# Patient Record
Sex: MALE | Race: WHITE | NOT HISPANIC OR LATINO | URBAN - METROPOLITAN AREA
[De-identification: names, ages, dates, MRNs, and addresses within clinical notes are randomized per-mention and may not be internally consistent; named-entity substitution may affect disease eponyms.]

---

## 2018-08-24 ENCOUNTER — INPATIENT (INPATIENT)
Facility: HOSPITAL | Age: 83
LOS: 3 days | Discharge: NURSING, REHAB OR LONG-TERM CARE | DRG: 640 | End: 2018-08-28
Attending: INTERNAL MEDICINE | Admitting: INTERNAL MEDICINE
Payer: MEDICARE

## 2018-08-24 VITALS
RESPIRATION RATE: 19 BRPM | HEIGHT: 68 IN | DIASTOLIC BLOOD PRESSURE: 95 MMHG | OXYGEN SATURATION: 98 % | SYSTOLIC BLOOD PRESSURE: 189 MMHG | TEMPERATURE: 98 F | HEART RATE: 64 BPM | WEIGHT: 169.98 LBS

## 2018-08-24 DIAGNOSIS — E87.5 HYPERKALEMIA: ICD-10-CM

## 2018-08-24 LAB
ALBUMIN SERPL ELPH-MCNC: 3.1 G/DL — LOW (ref 3.5–5)
ALP SERPL-CCNC: 102 U/L — SIGNIFICANT CHANGE UP (ref 40–120)
ALT FLD-CCNC: 19 U/L DA — SIGNIFICANT CHANGE UP (ref 10–60)
ANION GAP SERPL CALC-SCNC: 12 MMOL/L — SIGNIFICANT CHANGE UP (ref 5–17)
AST SERPL-CCNC: 19 U/L — SIGNIFICANT CHANGE UP (ref 10–40)
BILIRUB SERPL-MCNC: 0.2 MG/DL — SIGNIFICANT CHANGE UP (ref 0.2–1.2)
BUN SERPL-MCNC: 112 MG/DL — HIGH (ref 7–18)
CALCIUM SERPL-MCNC: 8.2 MG/DL — LOW (ref 8.4–10.5)
CHLORIDE SERPL-SCNC: 107 MMOL/L — SIGNIFICANT CHANGE UP (ref 96–108)
CO2 SERPL-SCNC: 19 MMOL/L — LOW (ref 22–31)
CREAT SERPL-MCNC: 6.39 MG/DL — HIGH (ref 0.5–1.3)
GLUCOSE SERPL-MCNC: 113 MG/DL — HIGH (ref 70–99)
HCT VFR BLD CALC: 29.1 % — LOW (ref 39–50)
HGB BLD-MCNC: 9 G/DL — LOW (ref 13–17)
MAGNESIUM SERPL-MCNC: 2.1 MG/DL — SIGNIFICANT CHANGE UP (ref 1.6–2.6)
MCHC RBC-ENTMCNC: 30.8 GM/DL — LOW (ref 32–36)
MCHC RBC-ENTMCNC: 32.2 PG — SIGNIFICANT CHANGE UP (ref 27–34)
MCV RBC AUTO: 104.5 FL — HIGH (ref 80–100)
PLATELET # BLD AUTO: 146 K/UL — LOW (ref 150–400)
POTASSIUM SERPL-MCNC: 5.8 MMOL/L — HIGH (ref 3.5–5.3)
POTASSIUM SERPL-SCNC: 5.8 MMOL/L — HIGH (ref 3.5–5.3)
PROT SERPL-MCNC: 8 G/DL — SIGNIFICANT CHANGE UP (ref 6–8.3)
RBC # BLD: 2.79 M/UL — LOW (ref 4.2–5.8)
RBC # FLD: 14.9 % — HIGH (ref 10.3–14.5)
SODIUM SERPL-SCNC: 138 MMOL/L — SIGNIFICANT CHANGE UP (ref 135–145)
WBC # BLD: 5.4 K/UL — SIGNIFICANT CHANGE UP (ref 3.8–10.5)
WBC # FLD AUTO: 5.4 K/UL — SIGNIFICANT CHANGE UP (ref 3.8–10.5)

## 2018-08-24 PROCEDURE — 93010 ELECTROCARDIOGRAM REPORT: CPT

## 2018-08-24 RX ORDER — SODIUM POLYSTYRENE SULFONATE 4.1 MEQ/G
30 POWDER, FOR SUSPENSION ORAL ONCE
Qty: 0 | Refills: 0 | Status: COMPLETED | OUTPATIENT
Start: 2018-08-24 | End: 2018-08-24

## 2018-08-24 RX ORDER — SODIUM CHLORIDE 9 MG/ML
1000 INJECTION INTRAMUSCULAR; INTRAVENOUS; SUBCUTANEOUS ONCE
Qty: 0 | Refills: 0 | Status: DISCONTINUED | OUTPATIENT
Start: 2018-08-24 | End: 2018-08-24

## 2018-08-24 RX ORDER — INSULIN HUMAN 100 [IU]/ML
13 INJECTION, SOLUTION SUBCUTANEOUS ONCE
Qty: 0 | Refills: 0 | Status: COMPLETED | OUTPATIENT
Start: 2018-08-24 | End: 2018-08-24

## 2018-08-24 RX ORDER — DEXTROSE 50 % IN WATER 50 %
50 SYRINGE (ML) INTRAVENOUS ONCE
Qty: 0 | Refills: 0 | Status: COMPLETED | OUTPATIENT
Start: 2018-08-24 | End: 2018-08-24

## 2018-08-24 RX ORDER — IPRATROPIUM/ALBUTEROL SULFATE 18-103MCG
3 AEROSOL WITH ADAPTER (GRAM) INHALATION
Qty: 0 | Refills: 0 | Status: COMPLETED | OUTPATIENT
Start: 2018-08-24 | End: 2018-08-25

## 2018-08-24 RX ORDER — CALCIUM GLUCONATE 100 MG/ML
1 VIAL (ML) INTRAVENOUS ONCE
Qty: 0 | Refills: 0 | Status: COMPLETED | OUTPATIENT
Start: 2018-08-24 | End: 2018-08-24

## 2018-08-24 NOTE — H&P ADULT - PROBLEM SELECTOR PLAN 5
IMPROVE VTE Individual Risk Assessment    RISK                                                          Points  [] Previous VTE                                           3  [] Thrombophilia                                        2  [] Lower limb paralysis                              2   [] Current Cancer                                       2   [x] Immobilization > 24 hrs                        1  [] ICU/CCU stay > 24 hours                       1  [x] Age > 60                                                   1    IMPROVE VTE Score: 2, DVT Ppx w/ HSQ

## 2018-08-24 NOTE — H&P ADULT - HISTORY OF PRESENT ILLNESS
89 year old  male w/ PMH HTN, HLD, Bipolar Disorder, ESRD, and MDD p/w hyperkalemia - patient sent from NH for elevated potassium of 6, repeated in the ED showed 5.8. EKG was done showing widened QRS complexes and given calcium gluconate, regular insulin w/ dextrose, and albuterol nebulizers. Patient has a known Hx of hyperkalemia and ESRD - has had multiple discussions w/ outpatient nephrologist. I personally discussed w/ the patient the treatment plan including medical therapy and dialysis - patient and HCP (sister, Felisa) agree to medical therapy but refused HD - they are aware of the risks of doing so including death. Otherwise patient has no complaints - c/o hunger at bedside and reports that he still urinates albeit incontinent and in a adult diaper.

## 2018-08-24 NOTE — H&P ADULT - PROBLEM SELECTOR PLAN 2
Elevated in ED; resuming outpatient medications; Imdur, Lopressor, and Nifedipine  - Monitor and adjust PRN; no signs of fluid overload on examination

## 2018-08-24 NOTE — H&P ADULT - PROBLEM SELECTOR PLAN 1
P/w hyperkalemia; known as per PCP, 2/2 ESRD, refuses HD  - EKG shows QRS widening; given Cocktail in ED  - DNR/DNI/no hemodialysis   Nephrology Dr Randolph  ***F/u BMP in AM and nephrologist's recommendations P/w hyperkalemia; known as per PCP, 2/2 ESRD, refuses HD  - EKG shows QRS widening; given Cocktail in ED  - DNR/DNI and no hemodialysis as per patient/HCP wishes.   Nephrology Dr Rutehrford  ***F/u BMP in AM and nephrologist's recommendations

## 2018-08-24 NOTE — ED PROVIDER NOTE - MEDICAL DECISION MAKING DETAILS
89 y.o presenting with hyperkalemia. well appearing,. no signs of distress. no other medical complaints. will obtain basic lab, ekg to look for peaked twave. dispo pending lab result, if elevated, will admit for further work up and renal work up

## 2018-08-24 NOTE — H&P ADULT - ATTENDING COMMENTS
Patient seen and examined in ED. Patient's history, vitals, labs, imaging studies reviewed. Discussed with above resident, agree with note with edits. Plan of care discussed with patient, sister, and agrees, all questions answered.   Elsy Mulligan MD  8/24/2018 Patient seen and examined in ED. Patient's history, vitals, labs, imaging studies reviewed. Discussed with above resident, agree with note with edits. Plan of care discussed with patient, sister, and agrees, all questions answered  Elsy Mulligan MD  8/24/2018

## 2018-08-24 NOTE — H&P ADULT - NSHPPHYSICALEXAM_GEN_ALL_CORE
T(C): 36.7 (24 Aug 2018 19:50), Max: 36.7 (24 Aug 2018 19:50)  T(F): 98 (24 Aug 2018 19:50), Max: 98 (24 Aug 2018 19:50)  HR: 64 (24 Aug 2018 19:50) (64 - 64)  BP: 189/95 (24 Aug 2018 19:50) (189/95 - 189/95)  RR: 19 (24 Aug 2018 19:50) (19 - 19)  SpO2: 98% (24 Aug 2018 19:50) (98% - 98%)

## 2018-08-24 NOTE — ED ADULT NURSE NOTE - NSIMPLEMENTINTERV_GEN_ALL_ED
Implemented All Fall with Harm Risk Interventions:  [place] to call system. Call bell, personal items and telephone within reach. Instruct patient to call for assistance. Room bathroom lighting operational. Non-slip footwear when patient is off stretcher. Physically safe environment: no spills, clutter or unnecessary equipment. Stretcher in lowest position, wheels locked, appropriate side rails in place. Provide visual cue, wrist band, yellow gown, etc. Monitor gait and stability. Monitor for mental status changes and reorient to person, place, and time. Review medications for side effects contributing to fall risk. Reinforce activity limits and safety measures with patient and family. Provide visual clues: red socks.

## 2018-08-24 NOTE — H&P ADULT - ASSESSMENT
89 year old  male w/ PMH HTN, HLD, Bipolar Disorder, ESRD, and MDD p/w hyperkalemia - admitting for medical management

## 2018-08-24 NOTE — ED PROVIDER NOTE - OBJECTIVE STATEMENT
89 y.o presenting complaint of hyperkalemia sent in from nh for K of 6. P 89 y.o presenting complaint of hyperkalemia sent in from nh for K of 6. Patient has history of ckd, refused dialysis in the past. Patient denies cp, sob, n, v, abd pain. per dr walker who sent the patient in, they've been trending the lab x 2 days and persistent elevated.

## 2018-08-24 NOTE — H&P ADULT - NSHPLABSRESULTS_GEN_ALL_CORE
CBC Full  -  ( 24 Aug 2018 21:36 )  WBC Count : 5.4 K/uL  Hemoglobin : 9.0 g/dL  Hematocrit : 29.1 %  Platelet Count - Automated : 146 K/uL  Mean Cell Volume : 104.5 fl  Mean Cell Hemoglobin : 32.2 pg  Mean Cell Hemoglobin Concentration : 30.8 gm/dL  Auto Neutrophil # : x  Auto Lymphocyte # : x  Auto Monocyte # : x  Auto Eosinophil # : x  Auto Basophil # : x  Auto Neutrophil % : x  Auto Lymphocyte % : x  Auto Monocyte % : x  Auto Eosinophil % : x  Auto Basophil % : x    08-24    138  |  107  |  112<H>  ----------------------------<  113<H>  5.8<H>   |  19<L>  |  6.39<H>    Ca    8.2<L>      24 Aug 2018 21:36  Mg     2.1     08-24    TPro  8.0  /  Alb  3.1<L>  /  TBili  0.2  /  DBili  x   /  AST  19  /  ALT  19  /  AlkPhos  102  08-24    RADIOLOGY & ADDITIONAL STUDIES (The following images were personally reviewed):    EKG widened QRS at 63 BPM

## 2018-08-24 NOTE — H&P ADULT - FAMILY HISTORY
Mother  Still living? Unknown  Family history of cerebrovascular accident (CVA), Age at diagnosis: Age Unknown     Father  Still living? Unknown  Family history of colorectal cancer, Age at diagnosis: Age Unknown  Family history of acute myocardial infarction, Age at diagnosis: Age Unknown

## 2018-08-24 NOTE — H&P ADULT - NSHPSOCIALHISTORY_GEN_ALL_CORE
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

## 2018-08-25 DIAGNOSIS — E87.5 HYPERKALEMIA: ICD-10-CM

## 2018-08-25 DIAGNOSIS — I10 ESSENTIAL (PRIMARY) HYPERTENSION: ICD-10-CM

## 2018-08-25 DIAGNOSIS — F31.9 BIPOLAR DISORDER, UNSPECIFIED: ICD-10-CM

## 2018-08-25 DIAGNOSIS — E78.5 HYPERLIPIDEMIA, UNSPECIFIED: ICD-10-CM

## 2018-08-25 DIAGNOSIS — Z29.9 ENCOUNTER FOR PROPHYLACTIC MEASURES, UNSPECIFIED: ICD-10-CM

## 2018-08-25 LAB
ANION GAP SERPL CALC-SCNC: 11 MMOL/L — SIGNIFICANT CHANGE UP (ref 5–17)
BUN SERPL-MCNC: 109 MG/DL — HIGH (ref 7–18)
CALCIUM SERPL-MCNC: 8.9 MG/DL — SIGNIFICANT CHANGE UP (ref 8.4–10.5)
CHLORIDE SERPL-SCNC: 108 MMOL/L — SIGNIFICANT CHANGE UP (ref 96–108)
CHOLEST SERPL-MCNC: 153 MG/DL — SIGNIFICANT CHANGE UP (ref 10–199)
CO2 SERPL-SCNC: 21 MMOL/L — LOW (ref 22–31)
CREAT SERPL-MCNC: 6.26 MG/DL — HIGH (ref 0.5–1.3)
FERRITIN SERPL-MCNC: 191 NG/ML — SIGNIFICANT CHANGE UP (ref 30–400)
FOLATE SERPL-MCNC: 6.5 NG/ML — SIGNIFICANT CHANGE UP
GLUCOSE SERPL-MCNC: 77 MG/DL — SIGNIFICANT CHANGE UP (ref 70–99)
HBA1C BLD-MCNC: 5.8 % — HIGH (ref 4–5.6)
HCT VFR BLD CALC: 30.4 % — LOW (ref 39–50)
HDLC SERPL-MCNC: 72 MG/DL — SIGNIFICANT CHANGE UP
HGB BLD-MCNC: 9.1 G/DL — LOW (ref 13–17)
IRON SATN MFR SERPL: 28 % — SIGNIFICANT CHANGE UP (ref 20–55)
IRON SATN MFR SERPL: 62 UG/DL — LOW (ref 65–170)
LIPID PNL WITH DIRECT LDL SERPL: 66 MG/DL — SIGNIFICANT CHANGE UP
MAGNESIUM SERPL-MCNC: 2.2 MG/DL — SIGNIFICANT CHANGE UP (ref 1.6–2.6)
MCHC RBC-ENTMCNC: 29.8 GM/DL — LOW (ref 32–36)
MCHC RBC-ENTMCNC: 31 PG — SIGNIFICANT CHANGE UP (ref 27–34)
MCV RBC AUTO: 104.1 FL — HIGH (ref 80–100)
PHOSPHATE SERPL-MCNC: 4.4 MG/DL — SIGNIFICANT CHANGE UP (ref 2.5–4.5)
PLATELET # BLD AUTO: 178 K/UL — SIGNIFICANT CHANGE UP (ref 150–400)
POTASSIUM SERPL-MCNC: 5 MMOL/L — SIGNIFICANT CHANGE UP (ref 3.5–5.3)
POTASSIUM SERPL-SCNC: 5 MMOL/L — SIGNIFICANT CHANGE UP (ref 3.5–5.3)
RBC # BLD: 2.92 M/UL — LOW (ref 4.2–5.8)
RBC # FLD: 14.4 % — SIGNIFICANT CHANGE UP (ref 10.3–14.5)
SODIUM SERPL-SCNC: 140 MMOL/L — SIGNIFICANT CHANGE UP (ref 135–145)
TIBC SERPL-MCNC: 222 UG/DL — LOW (ref 250–450)
TOTAL CHOLESTEROL/HDL RATIO MEASUREMENT: 2.1 RATIO — LOW (ref 3.4–9.6)
TRIGL SERPL-MCNC: 73 MG/DL — SIGNIFICANT CHANGE UP (ref 10–149)
TSH SERPL-MCNC: 1.56 UU/ML — SIGNIFICANT CHANGE UP (ref 0.34–4.82)
UIBC SERPL-MCNC: 160 UG/DL — SIGNIFICANT CHANGE UP (ref 110–370)
VIT B12 SERPL-MCNC: 528 PG/ML — SIGNIFICANT CHANGE UP (ref 232–1245)
WBC # BLD: 6.3 K/UL — SIGNIFICANT CHANGE UP (ref 3.8–10.5)
WBC # FLD AUTO: 6.3 K/UL — SIGNIFICANT CHANGE UP (ref 3.8–10.5)

## 2018-08-25 RX ORDER — SIMVASTATIN 20 MG/1
20 TABLET, FILM COATED ORAL AT BEDTIME
Qty: 0 | Refills: 0 | Status: DISCONTINUED | OUTPATIENT
Start: 2018-08-25 | End: 2018-08-28

## 2018-08-25 RX ORDER — METOPROLOL TARTRATE 50 MG
12.5 TABLET ORAL
Qty: 0 | Refills: 0 | Status: DISCONTINUED | OUTPATIENT
Start: 2018-08-25 | End: 2018-08-28

## 2018-08-25 RX ORDER — LAMOTRIGINE 25 MG/1
100 TABLET, ORALLY DISINTEGRATING ORAL
Qty: 0 | Refills: 0 | Status: DISCONTINUED | OUTPATIENT
Start: 2018-08-25 | End: 2018-08-28

## 2018-08-25 RX ORDER — QUETIAPINE FUMARATE 200 MG/1
50 TABLET, FILM COATED ORAL DAILY
Qty: 0 | Refills: 0 | Status: DISCONTINUED | OUTPATIENT
Start: 2018-08-25 | End: 2018-08-28

## 2018-08-25 RX ORDER — FERROUS SULFATE 325(65) MG
325 TABLET ORAL DAILY
Qty: 0 | Refills: 0 | Status: DISCONTINUED | OUTPATIENT
Start: 2018-08-25 | End: 2018-08-28

## 2018-08-25 RX ORDER — NIFEDIPINE 30 MG
30 TABLET, EXTENDED RELEASE 24 HR ORAL DAILY
Qty: 0 | Refills: 0 | Status: DISCONTINUED | OUTPATIENT
Start: 2018-08-25 | End: 2018-08-28

## 2018-08-25 RX ORDER — MIRTAZAPINE 45 MG/1
15 TABLET, ORALLY DISINTEGRATING ORAL AT BEDTIME
Qty: 0 | Refills: 0 | Status: DISCONTINUED | OUTPATIENT
Start: 2018-08-25 | End: 2018-08-28

## 2018-08-25 RX ORDER — QUETIAPINE FUMARATE 200 MG/1
100 TABLET, FILM COATED ORAL AT BEDTIME
Qty: 0 | Refills: 0 | Status: DISCONTINUED | OUTPATIENT
Start: 2018-08-25 | End: 2018-08-28

## 2018-08-25 RX ORDER — LANOLIN ALCOHOL/MO/W.PET/CERES
5 CREAM (GRAM) TOPICAL AT BEDTIME
Qty: 0 | Refills: 0 | Status: DISCONTINUED | OUTPATIENT
Start: 2018-08-25 | End: 2018-08-28

## 2018-08-25 RX ORDER — HEPARIN SODIUM 5000 [USP'U]/ML
5000 INJECTION INTRAVENOUS; SUBCUTANEOUS EVERY 8 HOURS
Qty: 0 | Refills: 0 | Status: DISCONTINUED | OUTPATIENT
Start: 2018-08-25 | End: 2018-08-28

## 2018-08-25 RX ORDER — ISOSORBIDE MONONITRATE 60 MG/1
30 TABLET, EXTENDED RELEASE ORAL DAILY
Qty: 0 | Refills: 0 | Status: DISCONTINUED | OUTPATIENT
Start: 2018-08-25 | End: 2018-08-28

## 2018-08-25 RX ADMIN — LAMOTRIGINE 100 MILLIGRAM(S): 25 TABLET, ORALLY DISINTEGRATING ORAL at 06:47

## 2018-08-25 RX ADMIN — Medication 325 MILLIGRAM(S): at 12:48

## 2018-08-25 RX ADMIN — Medication 50 MILLILITER(S): at 02:05

## 2018-08-25 RX ADMIN — ISOSORBIDE MONONITRATE 30 MILLIGRAM(S): 60 TABLET, EXTENDED RELEASE ORAL at 12:48

## 2018-08-25 RX ADMIN — Medication 3 MILLILITER(S): at 02:03

## 2018-08-25 RX ADMIN — QUETIAPINE FUMARATE 50 MILLIGRAM(S): 200 TABLET, FILM COATED ORAL at 12:48

## 2018-08-25 RX ADMIN — MIRTAZAPINE 15 MILLIGRAM(S): 45 TABLET, ORALLY DISINTEGRATING ORAL at 22:18

## 2018-08-25 RX ADMIN — HEPARIN SODIUM 5000 UNIT(S): 5000 INJECTION INTRAVENOUS; SUBCUTANEOUS at 06:46

## 2018-08-25 RX ADMIN — LAMOTRIGINE 100 MILLIGRAM(S): 25 TABLET, ORALLY DISINTEGRATING ORAL at 17:12

## 2018-08-25 RX ADMIN — Medication 200 GRAM(S): at 02:04

## 2018-08-25 RX ADMIN — Medication 12.5 MILLIGRAM(S): at 06:47

## 2018-08-25 RX ADMIN — HEPARIN SODIUM 5000 UNIT(S): 5000 INJECTION INTRAVENOUS; SUBCUTANEOUS at 13:19

## 2018-08-25 RX ADMIN — QUETIAPINE FUMARATE 100 MILLIGRAM(S): 200 TABLET, FILM COATED ORAL at 22:18

## 2018-08-25 RX ADMIN — HEPARIN SODIUM 5000 UNIT(S): 5000 INJECTION INTRAVENOUS; SUBCUTANEOUS at 22:17

## 2018-08-25 RX ADMIN — Medication 12.5 MILLIGRAM(S): at 17:12

## 2018-08-25 RX ADMIN — Medication 30 MILLIGRAM(S): at 06:47

## 2018-08-25 RX ADMIN — Medication 5 MILLIGRAM(S): at 22:18

## 2018-08-25 RX ADMIN — SODIUM POLYSTYRENE SULFONATE 30 GRAM(S): 4.1 POWDER, FOR SUSPENSION ORAL at 02:04

## 2018-08-25 RX ADMIN — INSULIN HUMAN 13 UNIT(S): 100 INJECTION, SOLUTION SUBCUTANEOUS at 02:19

## 2018-08-25 RX ADMIN — SIMVASTATIN 20 MILLIGRAM(S): 20 TABLET, FILM COATED ORAL at 22:18

## 2018-08-25 NOTE — PROGRESS NOTE ADULT - PROBLEM SELECTOR PLAN 2
Elevated in ED; resuming outpatient medications; Imdur, Lopressor, and Nifedipine  - Monitor and adjust PRN; no signs of fluid overload on examination Elevated in ED;  c/w  Imdur, Lopressor, and Nifedipine  - Monitor and adjust PRN; no signs of fluid overload on examination

## 2018-08-25 NOTE — CONSULT NOTE ADULT - SUBJECTIVE AND OBJECTIVE BOX
HPI:  89 year old  male w/ PMH HTN, HLD, Bipolar Disorder, ESRD, and MDD p/w hyperkalemia - patient sent from NH for elevated potassium of 6, repeated in the ED showed 5.8. EKG was done showing widened QRS complexes and given calcium gluconate, regular insulin w/ dextrose, and albuterol nebulizers. Patient has a known Hx of hyperkalemia and ESRD - has had multiple discussions w/ outpatient nephrologist. I personally discussed w/ the patient the treatment plan including medical therapy and dialysis - patient and HCP (sister, Felisa) agree to medical therapy but refused HD - they are aware of the risks of doing so including death. Otherwise patient has no complaints - c/o hunger at bedside and reports that he still urinates albeit incontinent and in a adult diaper. (24 Aug 2018 23:24)    PMH:   Bipolar 1 disorder  ESRD (end stage renal disease)  HLD (hyperlipidemia)  HTN (hypertension)    PSH:   No significant past surgical history    FAMILY HISTORY:  Family history of acute myocardial infarction (Father)  Family history of colorectal cancer (Father)  Family history of cerebrovascular accident (CVA) (Mother)      Social History:  non-smoker/ non-alcoholic     Home Meds:  MEDICATIONS  (STANDING):  ferrous    sulfate 325 milliGRAM(s) Oral daily  heparin  Injectable 5000 Unit(s) SubCutaneous every 8 hours  isosorbide   mononitrate ER Tablet (IMDUR) 30 milliGRAM(s) Oral daily  lamoTRIgine 100 milliGRAM(s) Oral two times a day  melatonin 5 milliGRAM(s) Oral at bedtime  metoprolol tartrate 12.5 milliGRAM(s) Oral two times a day  mirtazapine 15 milliGRAM(s) Oral at bedtime  NIFEdipine XL 30 milliGRAM(s) Oral daily  QUEtiapine 100 milliGRAM(s) Oral at bedtime  QUEtiapine 50 milliGRAM(s) Oral daily  simvastatin 20 milliGRAM(s) Oral at bedtime    Allergies:  penicillin (Hives)            REVIEW OF SYSTEMS:    CONSTITUTIONAL: No fever or chills  EYES: No eye pain, visual disturbances, or discharge  NECK: No pain or stiffness  RESPIRATORY: No cough. No shortness of breath  CARDIOVASCULAR: No chest pain, palpitations, dizziness, or leg swelling  GASTROINTESTINAL: No abdominal or epigastric pain. No nausea, vomiting, or diarrhea  GENITOURINARY: Positive for incontinence  NEUROLOGICAL: No headaches  SKIN: No itching, burning, rashes    Vital Signs Last 24 Hrs  T(C): 37.1 (25 Aug 2018 11:02), Max: 37.1 (25 Aug 2018 11:02)  T(F): 98.7 (25 Aug 2018 11:02), Max: 98.7 (25 Aug 2018 11:02)  HR: 62 (25 Aug 2018 11:02) (62 - 90)  BP: 173/70 (25 Aug 2018 11:02) (149/70 - 189/95)  BP(mean): --  RR: 16 (25 Aug 2018 11:02) (16 - 19)  SpO2: 98% (25 Aug 2018 11:02) (98% - 99%)    08-25 @ 07:01  -  08-25 @ 14:53  --------------------------------------------------------  IN: 100 mL / OUT: 0 mL / NET: 100 mL    PHYSICAL EXAM:    GENERAL: NAD, well-groomed, well-developed  HEAD:  Atraumatic, Normocephalic  EYES: Sclera anicteric  ENMT: Moist mucous membranes  NECK: Supple, No JVD  CHEST/LUNG: Clear   HEART: Regular rate and rhythm; No murmurs  ABDOMEN: Soft, Nontender, Nondistended; Bowel sounds present  EXTREMITIES:  No edema  SKIN: Normal turgor    LABS:                        9.1    6.3   )-----------( 178      ( 25 Aug 2018 07:06 )             30.4     08-25    140  |  108  |  109<H>  ----------------------------<  77  5.0   |  21<L>  |  6.26<H>    Ca    8.9      25 Aug 2018 07:06  Phos  4.4     08-25  Mg     2.2     08-25    TPro  8.0  /  Alb  3.1<L>  /  TBili  0.2  /  DBili  x   /  AST  19  /  ALT  19  /  AlkPhos  102  08-24  Magnesium, Serum: 2.2 mg/dL (08-25 @ 07:06)  Phosphorus Level, Serum: 4.4 mg/dL (08-25 @ 07:06)  Magnesium, Serum: 2.1 mg/dL (08-24 @ 21:36)    ASSESSMENT AND PLAN:  1. Hyperkalemia due advanced CKD  2. CKD stage 5. Dialysis refused  3. Anemia  4. Hypertension is uncontrolled.  2 g K diet. Kayexalate PRN  Keep patient euvolemic  Titrate BP medications  Epogen 6000 q week. Check TSAT  Avoid Nephrtoxic Meds/ Agents such as NSAIDs, Gadolinium contrast, Phosphate containing enemas, etc..)  Adjust Medications according to eGFR  Obtain Intact PTH  Many thanks

## 2018-08-25 NOTE — PROGRESS NOTE ADULT - PROBLEM SELECTOR PLAN 1
P/w hyperkalemia; known as per PCP, 2/2 ESRD, refuses HD  - EKG shows QRS widening; given Cocktail in ED  - DNR/DNI and no hemodialysis as per patient/HCP wishes.   Nephrology Dr Rutherford  ***F/u BMP in AM and nephrologist's recommendations P/w hyperkalemia; known as per PCP, 2/2 ESRD, refuses HD  - EKG shows QRS widening; given Cocktail in ED  - DNR/DNI and no hemodialysis as per patient/HCP wishes.   Nephrology Dr Rutherford  Resolved  K 5.0  f/u BMP in AM

## 2018-08-25 NOTE — PROGRESS NOTE ADULT - SUBJECTIVE AND OBJECTIVE BOX
Patient is a 89y old  Male who presents with a chief complaint of hyperkalemia (24 Aug 2018 23:24)      INTERVAL HPI/OVERNIGHT EVENTS:no overnight events    MEDICATIONS  (STANDING):  ferrous    sulfate 325 milliGRAM(s) Oral daily  heparin  Injectable 5000 Unit(s) SubCutaneous every 8 hours  isosorbide   mononitrate ER Tablet (IMDUR) 30 milliGRAM(s) Oral daily  lamoTRIgine 100 milliGRAM(s) Oral two times a day  melatonin 5 milliGRAM(s) Oral at bedtime  metoprolol tartrate 12.5 milliGRAM(s) Oral two times a day  mirtazapine 15 milliGRAM(s) Oral at bedtime  NIFEdipine XL 30 milliGRAM(s) Oral daily  QUEtiapine 100 milliGRAM(s) Oral at bedtime  QUEtiapine 50 milliGRAM(s) Oral daily  simvastatin 20 milliGRAM(s) Oral at bedtime    MEDICATIONS  (PRN):      Allergies    penicillin (Hives)    Intolerances        REVIEW OF SYSTEMS:  CONSTITUTIONAL: No fever, weight loss, or fatigue  RESPIRATORY: No cough, wheezing, chills or hemoptysis; No shortness of breath  CARDIOVASCULAR: No chest pain, palpitations, dizziness, or leg swelling  GASTROINTESTINAL: No abdominal or epigastric pain. No nausea, vomiting, or hematemesis; No diarrhea or constipation. No melena or hematochezia.  NEUROLOGICAL: No headaches, memory loss, loss of strength, numbness, or tremors  SKIN: No itching, burning, rashes, or lesions     Vital Signs Last 24 Hrs  T(C): 36.9 (25 Aug 2018 07:24), Max: 36.9 (25 Aug 2018 07:24)  T(F): 98.5 (25 Aug 2018 07:24), Max: 98.5 (25 Aug 2018 07:24)  HR: 90 (25 Aug 2018 07:24) (64 - 90)  BP: 161/79 (25 Aug 2018 07:24) (149/70 - 189/95)  BP(mean): --  RR: 16 (25 Aug 2018 07:24) (16 - 19)  SpO2: 98% (25 Aug 2018 07:24) (98% - 99%)    PHYSICAL EXAM:  GENERAL: NAD,  HEAD:  Atraumatic, Normocephalic  EYES: EOMI, PERRLA, conjunctiva and sclera clear  NECK: Supple, No JVD, Normal thyroid  CHEST/LUNG: Clear to percussion bilaterally; No rales, rhonchi, wheezing, or rubs  HEART: Regular rate and rhythm; No murmurs, rubs, or gallops  ABDOMEN: Soft, Nontender, Nondistended; Bowel sounds present  NERVOUS SYSTEM:  Alert & Oriented X3, Good concentration; Motor Strength 5/5 B/L   EXTREMITIES:  2+ Peripheral Pulses, No clubbing, cyanosis, or edema  SKIN;    LABS:                        9.0    5.4   )-----------( 146      ( 24 Aug 2018 21:36 )             29.1     08-24    138  |  107  |  112<H>  ----------------------------<  113<H>  5.8<H>   |  19<L>  |  6.39<H>    Ca    8.2<L>      24 Aug 2018 21:36  Mg     2.1     08-24    TPro  8.0  /  Alb  3.1<L>  /  TBili  0.2  /  DBili  x   /  AST  19  /  ALT  19  /  AlkPhos  102  08-24        CAPILLARY BLOOD GLUCOSE      POCT Blood Glucose.: 114 mg/dL (24 Aug 2018 19:56)      RADIOLOGY & ADDITIONAL TESTS: Patient is a 89y old  Male who presents with a chief complaint of hyperkalemia (24 Aug 2018 23:24)      INTERVAL HPI/OVERNIGHT EVENTS:no overnight events, pt wants to sleep    MEDICATIONS  (STANDING):  ferrous    sulfate 325 milliGRAM(s) Oral daily  heparin  Injectable 5000 Unit(s) SubCutaneous every 8 hours  isosorbide   mononitrate ER Tablet (IMDUR) 30 milliGRAM(s) Oral daily  lamoTRIgine 100 milliGRAM(s) Oral two times a day  melatonin 5 milliGRAM(s) Oral at bedtime  metoprolol tartrate 12.5 milliGRAM(s) Oral two times a day  mirtazapine 15 milliGRAM(s) Oral at bedtime  NIFEdipine XL 30 milliGRAM(s) Oral daily  QUEtiapine 100 milliGRAM(s) Oral at bedtime  QUEtiapine 50 milliGRAM(s) Oral daily  simvastatin 20 milliGRAM(s) Oral at bedtime    MEDICATIONS  (PRN):  Allergies    penicillin (Hives)    Intolerances        REVIEW OF SYSTEMS:  CONSTITUTIONAL: No fever, weight loss, or fatigue  RESPIRATORY: No cough, wheezing, chills or hemoptysis; No shortness of breath  CARDIOVASCULAR: No chest pain, palpitations, dizziness, or leg swelling  GASTROINTESTINAL: No abdominal or epigastric pain. No nausea, vomiting, or hematemesis; No diarrhea or constipation. No melena or hematochezia.  NEUROLOGICAL: No headaches, memory loss, loss of strength, numbness, or tremors  SKIN: No itching, burning, rashes, or lesions     Vital Signs Last 24 Hrs  T(C): 36.9 (25 Aug 2018 07:24), Max: 36.9 (25 Aug 2018 07:24)  T(F): 98.5 (25 Aug 2018 07:24), Max: 98.5 (25 Aug 2018 07:24)  HR: 90 (25 Aug 2018 07:24) (64 - 90)  BP: 161/79 (25 Aug 2018 07:24) (149/70 - 189/95)  BP(mean): --  RR: 16 (25 Aug 2018 07:24) (16 - 19)  SpO2: 98% (25 Aug 2018 07:24) (98% - 99%)    PHYSICAL EXAM:  GENERAL: NAD,  HEAD:  Atraumatic, Normocephalic  EYES: EOMI, PERRLA, conjunctiva and sclera clear  NECK: Supple, No JVD, Normal thyroid  CHEST/LUNG: Clear to percussion bilaterally; No rales, rhonchi, wheezing, or rubs  HEART: Regular rate and rhythm; No murmurs, rubs, or gallops  ABDOMEN: Soft, Nontender, Nondistended; Bowel sounds present  NERVOUS SYSTEM:  Alert & Oriented X3, Good concentration; Motor Strength 5/5 B/L   EXTREMITIES:  2+ Peripheral Pulses, No clubbing, cyanosis, or edema  SKIN;    LABS:                        9.0    5.4   )-----------( 146      ( 24 Aug 2018 21:36 )             29.1     08-24    138  |  107  |  112<H>  ----------------------------<  113<H>  5.8<H>   |  19<L>  |  6.39<H>    Ca    8.2<L>      24 Aug 2018 21:36  Mg     2.1     08-24    TPro  8.0  /  Alb  3.1<L>  /  TBili  0.2  /  DBili  x   /  AST  19  /  ALT  19  /  AlkPhos  102  08-24        CAPILLARY BLOOD GLUCOSE      POCT Blood Glucose.: 114 mg/dL (24 Aug 2018 19:56)      RADIOLOGY & ADDITIONAL TESTS: Patient is a 89y old  Male who presents with a chief complaint of hyperkalemia (24 Aug 2018 23:24)      INTERVAL HPI/OVERNIGHT EVENTS:no overnight events, pt wants to sleep    MEDICATIONS  (STANDING):  ferrous    sulfate 325 milliGRAM(s) Oral daily  heparin  Injectable 5000 Unit(s) SubCutaneous every 8 hours  isosorbide   mononitrate ER Tablet (IMDUR) 30 milliGRAM(s) Oral daily  lamoTRIgine 100 milliGRAM(s) Oral two times a day  melatonin 5 milliGRAM(s) Oral at bedtime  metoprolol tartrate 12.5 milliGRAM(s) Oral two times a day  mirtazapine 15 milliGRAM(s) Oral at bedtime  NIFEdipine XL 30 milliGRAM(s) Oral daily  QUEtiapine 100 milliGRAM(s) Oral at bedtime  QUEtiapine 50 milliGRAM(s) Oral daily  simvastatin 20 milliGRAM(s) Oral at bedtime    MEDICATIONS  (PRN):  Allergies    penicillin (Hives)            REVIEW OF SYSTEMS:  CONSTITUTIONAL: No fever, weight loss, or fatigue  RESPIRATORY: No cough, wheezing, chills or hemoptysis; No shortness of breath  CARDIOVASCULAR: No chest pain, palpitations, dizziness, or leg swelling  GASTROINTESTINAL: No abdominal or epigastric pain. No nausea, vomiting, or hematemesis; No diarrhea or constipation. No melena or hematochezia.  NEUROLOGICAL: No headaches, memory loss, loss of strength, numbness, or tremors  SKIN: No itching, burning, rashes, or lesions     Vital Signs Last 24 Hrs  T(C): 36.9 (25 Aug 2018 07:24), Max: 36.9 (25 Aug 2018 07:24)  T(F): 98.5 (25 Aug 2018 07:24), Max: 98.5 (25 Aug 2018 07:24)  HR: 90 (25 Aug 2018 07:24) (64 - 90)  BP: 161/79 (25 Aug 2018 07:24) (149/70 - 189/95)  RR: 16 (25 Aug 2018 07:24) (16 - 19)  SpO2: 98% (25 Aug 2018 07:24) (98% - 99%)    PHYSICAL EXAM:  GENERAL: NAD,  HEAD:  Atraumatic, Normocephalic  EYES: EOMI, PERRL, conjunctiva and sclera clear  NECK: Supple, No JVD, Normal thyroid  CHEST/LUNG: Clear to percussion bilaterally; No rales, rhonchi, wheezing, or rubs  HEART: Regular rate and rhythm; No murmurs, rubs, or gallops  ABDOMEN: Soft, Nontender, Nondistended; Bowel sounds present  NERVOUS SYSTEM:  Alert & Oriented X3, Good concentration; Motor Strength 5/5 B/L   EXTREMITIES:  2+ Peripheral Pulses, No clubbing, cyanosis, or edema  SKIN; no rash    LABS:                        9.0    5.4   )-----------( 146      ( 24 Aug 2018 21:36 )             29.1     08-24    138  |  107  |  112<H>  ----------------------------<  113<H>  5.8<H>   |  19<L>  |  6.39<H>    Ca    8.2<L>      24 Aug 2018 21:36  Mg     2.1     08-24    TPro  8.0  /  Alb  3.1<L>  /  TBili  0.2  /  DBili  x   /  AST  19  /  ALT  19  /  AlkPhos  102  08-24        CAPILLARY BLOOD GLUCOSE      POCT Blood Glucose.: 114 mg/dL (24 Aug 2018 19:56)

## 2018-08-25 NOTE — PROGRESS NOTE ADULT - ASSESSMENT
89 year old  male w/ PMH HTN, HLD, Bipolar Disorder, ESRD, and MDD p/w hyperkalemia - admitting for medical management   Pt assessed at the bedside. no New complaints. pt is stable 89 year old  male w/ PMH HTN, HLD, Bipolar Disorder, ESRD, and MDD p/w hyperkalemia - admitting for medical management   Pt assessed at the bedside. no New complaints. pt is stable wants to sleep

## 2018-08-26 LAB
ALBUMIN SERPL ELPH-MCNC: 2.8 G/DL — LOW (ref 3.5–5)
ALP SERPL-CCNC: 92 U/L — SIGNIFICANT CHANGE UP (ref 40–120)
ALT FLD-CCNC: 20 U/L DA — SIGNIFICANT CHANGE UP (ref 10–60)
ANION GAP SERPL CALC-SCNC: 10 MMOL/L — SIGNIFICANT CHANGE UP (ref 5–17)
ANION GAP SERPL CALC-SCNC: 13 MMOL/L — SIGNIFICANT CHANGE UP (ref 5–17)
ANION GAP SERPL CALC-SCNC: 6 MMOL/L — SIGNIFICANT CHANGE UP (ref 5–17)
AST SERPL-CCNC: 18 U/L — SIGNIFICANT CHANGE UP (ref 10–40)
BASOPHILS # BLD AUTO: 0 K/UL — SIGNIFICANT CHANGE UP (ref 0–0.2)
BASOPHILS NFR BLD AUTO: 0.8 % — SIGNIFICANT CHANGE UP (ref 0–2)
BILIRUB SERPL-MCNC: 0.2 MG/DL — SIGNIFICANT CHANGE UP (ref 0.2–1.2)
BUN SERPL-MCNC: 106 MG/DL — HIGH (ref 7–18)
BUN SERPL-MCNC: 114 MG/DL — HIGH (ref 7–18)
BUN SERPL-MCNC: 89 MG/DL — HIGH (ref 7–18)
CALCIUM SERPL-MCNC: 7.8 MG/DL — LOW (ref 8.4–10.5)
CALCIUM SERPL-MCNC: 8.1 MG/DL — LOW (ref 8.4–10.5)
CALCIUM SERPL-MCNC: 8.3 MG/DL — LOW (ref 8.4–10.5)
CHLORIDE SERPL-SCNC: 110 MMOL/L — HIGH (ref 96–108)
CHLORIDE SERPL-SCNC: 110 MMOL/L — HIGH (ref 96–108)
CHLORIDE SERPL-SCNC: 121 MMOL/L — HIGH (ref 96–108)
CO2 SERPL-SCNC: 19 MMOL/L — LOW (ref 22–31)
CO2 SERPL-SCNC: 21 MMOL/L — LOW (ref 22–31)
CO2 SERPL-SCNC: 28 MMOL/L — SIGNIFICANT CHANGE UP (ref 22–31)
CREAT SERPL-MCNC: 1.61 MG/DL — HIGH (ref 0.5–1.3)
CREAT SERPL-MCNC: 6.27 MG/DL — HIGH (ref 0.5–1.3)
CREAT SERPL-MCNC: 6.6 MG/DL — HIGH (ref 0.5–1.3)
EOSINOPHIL # BLD AUTO: 0.3 K/UL — SIGNIFICANT CHANGE UP (ref 0–0.5)
EOSINOPHIL NFR BLD AUTO: 4.7 % — SIGNIFICANT CHANGE UP (ref 0–6)
GLUCOSE SERPL-MCNC: 106 MG/DL — HIGH (ref 70–99)
GLUCOSE SERPL-MCNC: 114 MG/DL — HIGH (ref 70–99)
GLUCOSE SERPL-MCNC: 97 MG/DL — SIGNIFICANT CHANGE UP (ref 70–99)
HCT VFR BLD CALC: 26.1 % — LOW (ref 39–50)
HGB BLD-MCNC: 8 G/DL — LOW (ref 13–17)
LYMPHOCYTES # BLD AUTO: 1.3 K/UL — SIGNIFICANT CHANGE UP (ref 1–3.3)
LYMPHOCYTES # BLD AUTO: 22.6 % — SIGNIFICANT CHANGE UP (ref 13–44)
MCHC RBC-ENTMCNC: 30.6 GM/DL — LOW (ref 32–36)
MCHC RBC-ENTMCNC: 31.7 PG — SIGNIFICANT CHANGE UP (ref 27–34)
MCV RBC AUTO: 103.7 FL — HIGH (ref 80–100)
MONOCYTES # BLD AUTO: 0.9 K/UL — SIGNIFICANT CHANGE UP (ref 0–0.9)
MONOCYTES NFR BLD AUTO: 15.3 % — HIGH (ref 2–14)
NEUTROPHILS # BLD AUTO: 3.2 K/UL — SIGNIFICANT CHANGE UP (ref 1.8–7.4)
NEUTROPHILS NFR BLD AUTO: 56.6 % — SIGNIFICANT CHANGE UP (ref 43–77)
PLATELET # BLD AUTO: 157 K/UL — SIGNIFICANT CHANGE UP (ref 150–400)
POTASSIUM SERPL-MCNC: 3.6 MMOL/L — SIGNIFICANT CHANGE UP (ref 3.5–5.3)
POTASSIUM SERPL-MCNC: 4.5 MMOL/L — SIGNIFICANT CHANGE UP (ref 3.5–5.3)
POTASSIUM SERPL-MCNC: 5.4 MMOL/L — HIGH (ref 3.5–5.3)
POTASSIUM SERPL-SCNC: 3.6 MMOL/L — SIGNIFICANT CHANGE UP (ref 3.5–5.3)
POTASSIUM SERPL-SCNC: 4.5 MMOL/L — SIGNIFICANT CHANGE UP (ref 3.5–5.3)
POTASSIUM SERPL-SCNC: 5.4 MMOL/L — HIGH (ref 3.5–5.3)
PROT SERPL-MCNC: 7.2 G/DL — SIGNIFICANT CHANGE UP (ref 6–8.3)
RBC # BLD: 2.52 M/UL — LOW (ref 4.2–5.8)
RBC # FLD: 14 % — SIGNIFICANT CHANGE UP (ref 10.3–14.5)
SODIUM SERPL-SCNC: 141 MMOL/L — SIGNIFICANT CHANGE UP (ref 135–145)
SODIUM SERPL-SCNC: 142 MMOL/L — SIGNIFICANT CHANGE UP (ref 135–145)
SODIUM SERPL-SCNC: 155 MMOL/L — HIGH (ref 135–145)
WBC # BLD: 5.6 K/UL — SIGNIFICANT CHANGE UP (ref 3.8–10.5)
WBC # FLD AUTO: 5.6 K/UL — SIGNIFICANT CHANGE UP (ref 3.8–10.5)

## 2018-08-26 PROCEDURE — 74018 RADEX ABDOMEN 1 VIEW: CPT | Mod: 26

## 2018-08-26 PROCEDURE — 93010 ELECTROCARDIOGRAM REPORT: CPT

## 2018-08-26 RX ORDER — INSULIN HUMAN 100 [IU]/ML
10 INJECTION, SOLUTION SUBCUTANEOUS ONCE
Qty: 0 | Refills: 0 | Status: COMPLETED | OUTPATIENT
Start: 2018-08-26 | End: 2018-08-26

## 2018-08-26 RX ORDER — DEXTROSE 50 % IN WATER 50 %
50 SYRINGE (ML) INTRAVENOUS ONCE
Qty: 0 | Refills: 0 | Status: COMPLETED | OUTPATIENT
Start: 2018-08-26 | End: 2018-08-26

## 2018-08-26 RX ORDER — SODIUM POLYSTYRENE SULFONATE 4.1 MEQ/G
30 POWDER, FOR SUSPENSION ORAL ONCE
Qty: 0 | Refills: 0 | Status: COMPLETED | OUTPATIENT
Start: 2018-08-26 | End: 2018-08-26

## 2018-08-26 RX ORDER — SODIUM CHLORIDE 9 MG/ML
1000 INJECTION, SOLUTION INTRAVENOUS
Qty: 0 | Refills: 0 | Status: DISCONTINUED | OUTPATIENT
Start: 2018-08-26 | End: 2018-08-26

## 2018-08-26 RX ORDER — CALCIUM GLUCONATE 100 MG/ML
1 VIAL (ML) INTRAVENOUS ONCE
Qty: 0 | Refills: 0 | Status: COMPLETED | OUTPATIENT
Start: 2018-08-26 | End: 2018-08-26

## 2018-08-26 RX ORDER — IPRATROPIUM/ALBUTEROL SULFATE 18-103MCG
3 AEROSOL WITH ADAPTER (GRAM) INHALATION ONCE
Qty: 0 | Refills: 0 | Status: COMPLETED | OUTPATIENT
Start: 2018-08-26 | End: 2018-08-26

## 2018-08-26 RX ADMIN — QUETIAPINE FUMARATE 100 MILLIGRAM(S): 200 TABLET, FILM COATED ORAL at 21:52

## 2018-08-26 RX ADMIN — LAMOTRIGINE 100 MILLIGRAM(S): 25 TABLET, ORALLY DISINTEGRATING ORAL at 17:10

## 2018-08-26 RX ADMIN — Medication 200 GRAM(S): at 10:43

## 2018-08-26 RX ADMIN — ISOSORBIDE MONONITRATE 30 MILLIGRAM(S): 60 TABLET, EXTENDED RELEASE ORAL at 11:52

## 2018-08-26 RX ADMIN — SODIUM POLYSTYRENE SULFONATE 30 GRAM(S): 4.1 POWDER, FOR SUSPENSION ORAL at 10:43

## 2018-08-26 RX ADMIN — Medication 50 MILLILITER(S): at 10:43

## 2018-08-26 RX ADMIN — HEPARIN SODIUM 5000 UNIT(S): 5000 INJECTION INTRAVENOUS; SUBCUTANEOUS at 13:38

## 2018-08-26 RX ADMIN — INSULIN HUMAN 10 UNIT(S): 100 INJECTION, SOLUTION SUBCUTANEOUS at 10:41

## 2018-08-26 RX ADMIN — HEPARIN SODIUM 5000 UNIT(S): 5000 INJECTION INTRAVENOUS; SUBCUTANEOUS at 21:52

## 2018-08-26 RX ADMIN — Medication 30 MILLIGRAM(S): at 06:50

## 2018-08-26 RX ADMIN — MIRTAZAPINE 15 MILLIGRAM(S): 45 TABLET, ORALLY DISINTEGRATING ORAL at 21:51

## 2018-08-26 RX ADMIN — Medication 12.5 MILLIGRAM(S): at 17:10

## 2018-08-26 RX ADMIN — LAMOTRIGINE 100 MILLIGRAM(S): 25 TABLET, ORALLY DISINTEGRATING ORAL at 06:50

## 2018-08-26 RX ADMIN — Medication 12.5 MILLIGRAM(S): at 06:50

## 2018-08-26 RX ADMIN — Medication 5 MILLIGRAM(S): at 21:52

## 2018-08-26 RX ADMIN — SIMVASTATIN 20 MILLIGRAM(S): 20 TABLET, FILM COATED ORAL at 21:51

## 2018-08-26 RX ADMIN — Medication 325 MILLIGRAM(S): at 11:52

## 2018-08-26 RX ADMIN — HEPARIN SODIUM 5000 UNIT(S): 5000 INJECTION INTRAVENOUS; SUBCUTANEOUS at 06:50

## 2018-08-26 RX ADMIN — QUETIAPINE FUMARATE 50 MILLIGRAM(S): 200 TABLET, FILM COATED ORAL at 11:52

## 2018-08-26 RX ADMIN — SODIUM CHLORIDE 70 MILLILITER(S): 9 INJECTION, SOLUTION INTRAVENOUS at 16:39

## 2018-08-26 NOTE — PROGRESS NOTE ADULT - SUBJECTIVE AND OBJECTIVE BOX
Patient is a 89y old  Male who presents with a chief complaint of hyperkalemia (24 Aug 2018 23:24)    INTERVAL HPI/OVERNIGHT EVENTS: no overnight events reports, pt states he feels tired  MEDICATIONS  (STANDING):  ferrous    sulfate 325 milliGRAM(s) Oral daily  heparin  Injectable 5000 Unit(s) SubCutaneous every 8 hours  isosorbide   mononitrate ER Tablet (IMDUR) 30 milliGRAM(s) Oral daily  lamoTRIgine 100 milliGRAM(s) Oral two times a day  melatonin 5 milliGRAM(s) Oral at bedtime  metoprolol tartrate 12.5 milliGRAM(s) Oral two times a day  mirtazapine 15 milliGRAM(s) Oral at bedtime  NIFEdipine XL 30 milliGRAM(s) Oral daily  QUEtiapine 100 milliGRAM(s) Oral at bedtime  QUEtiapine 50 milliGRAM(s) Oral daily  simvastatin 20 milliGRAM(s) Oral at bedtime  sodium chloride 0.45%. 1000 milliLiter(s) (70 mL/Hr) IV Continuous <Continuous>        REVIEW OF SYSTEMS:  CONSTITUTIONAL: No fever, weight loss. Has fatigue  EYES: No eye pain, visual disturbances, or discharge  ENMT:  No difficulty hearing, tinnitus, vertigo; No sinus or throat pain  NECK: No pain or stiffness  RESPIRATORY: No cough, wheezing, chills or hemoptysis; No shortness of breath  CARDIOVASCULAR: No chest pain, palpitations, dizziness, or leg swelling  GASTROINTESTINAL: No abdominal or epigastric pain. No nausea, vomiting, or hematemesis; No diarrhea or constipation. No melena or hematochezia.  GENITOURINARY: No dysuria, frequency, hematuria, or incontinence  NEUROLOGICAL: No headaches, memory loss, loss of strength, numbness, or tremors  SKIN: No itching, burning, rashes, or lesions   ENDOCRINE: No heat or cold intolerance; No hair loss  MUSCULOSKELETAL: No joint pain or swelling; No muscle, back, or extremity pain  PSYCHIATRIC: No depression, anxiety, mood swings, or difficulty sleeping  HEME/LYMPH: No easy bruising, or bleeding gums  ALLERY AND IMMUNOLOGIC: No hives or eczema          PHYSICAL EXAM:  T(C): 37.1 (08-26-18 @ 11:03), Max: 37.1 (08-26-18 @ 11:03)  HR: 64 (08-26-18 @ 11:03) (60 - 66)  BP: 156/66 (08-26-18 @ 11:03) (145/54 - 156/66)  RR: 16 (08-26-18 @ 11:03) (16 - 17)  SpO2: 98% (08-26-18 @ 11:03) (96% - 100%)    I&O's Summary    25 Aug 2018 07:01  -  26 Aug 2018 07:00  --------------------------------------------------------  IN: 500 mL / OUT: 0 mL / NET: 500 mL      GENERAL: NAD,  HEAD:  Atraumatic, Normocephalic  EYES: EOMI, PERRL, conjunctiva and sclera clear  NECK: Supple, No JVD, Normal thyroid  CHEST/LUNG: Clear to ascultation bilaterally; No rales, rhonchi, wheezing, or rubs  HEART: Regular rate and rhythm;  murmurs, no rubs, or gallops  ABDOMEN: Soft, Nontender, Nondistended; Bowel sounds present  NERVOUS SYSTEM:  Awake, Alert & Oriented   EXTREMITIES:  2+ Peripheral Pulses, No clubbing, cyanosis, or edema  SKIN; no rash      LABS:                        8.0    5.6   )-----------( 157      ( 26 Aug 2018 07:12 )             26.1     08-26    155<H>  |  121<H>  |  89<H>  ----------------------------<  114<H>  3.6   |  28  |  1.61<H>    Ca    8.3<L>      26 Aug 2018 12:48  Phos  4.4     08-25  Mg     2.2     08-25    TPro  7.2  /  Alb  2.8<L>  /  TBili  0.2  /  DBili  x   /  AST  18  /  ALT  20  /  AlkPhos  92  08-26      RADIOLOGY & ADDITIONAL TESTS:    Imaging Personally Reviewed:  [x] YES  [ ] NO    Consultant(s) Notes Reviewed:  [x] YES  [ ] NO    Care Discussed with Consultants/Other Providers [x] YES  [ ] NO

## 2018-08-26 NOTE — PROGRESS NOTE ADULT - ASSESSMENT
89 year old  male w/ PMH HTN, HLD, Bipolar Disorder, ESRD, and MDD p/w hyperkalemia - admitting for medical management   Pt assessed at the bedside. no New complaints. pt is stable wants to sleep

## 2018-08-26 NOTE — PROGRESS NOTE ADULT - PROBLEM SELECTOR PLAN 1
P/w hyperkalemia;, 2/2 ESRD, refuses HD  - EKG shows QRS widening; given Cocktail in ED  - DNR/DNI and no hemodialysis as per patient/HCP wishes.   Nephrology Dr Rutherford  f/u BMP in AM

## 2018-08-26 NOTE — PROGRESS NOTE ADULT - SUBJECTIVE AND OBJECTIVE BOX
CC: Patient denies CP, SOB, n/v/d, fever or chills    Vital Signs Last 24 Hrs  T(C): 36.7 (26 Aug 2018 15:30), Max: 37.1 (26 Aug 2018 11:03)  T(F): 98.1 (26 Aug 2018 15:30), Max: 98.7 (26 Aug 2018 11:03)  HR: 67 (26 Aug 2018 15:30) (60 - 67)  BP: 122/50 (26 Aug 2018 15:30) (122/50 - 156/66)  BP(mean): --  RR: 17 (26 Aug 2018 15:30) (16 - 17)  SpO2: 100% (26 Aug 2018 15:30) (96% - 100%)    08-25 @ 07:01 - 08-26 @ 07:00  --------------------------------------------------------  IN: 500 mL / OUT: 0 mL / NET: 500 mL    08-26 @ 07:01 - 08-26 @ 20:49  --------------------------------------------------------  IN: 310 mL / OUT: 0 mL / NET: 310 mL    PHYSICAL EXAM:  GENERAL: NAD, well-groomed, well-developed  HEAD:  Atraumatic, Normocephalic  EYES: Sclera anicteric  ENMT: Moist mucous membranes  NECK: Supple, No JVD  CHEST/LUNG: Clear   HEART: Regular rate and rhythm; No murmurs  ABDOMEN: Soft, Nontender, Nondistended; Bowel sounds present  EXTREMITIES:  No edema  SKIN: Normal turgor  MEDICATIONS:  ferrous    sulfate 325 milliGRAM(s) Oral daily  heparin  Injectable 5000 Unit(s) SubCutaneous every 8 hours  isosorbide   mononitrate ER Tablet (IMDUR) 30 milliGRAM(s) Oral daily  lamoTRIgine 100 milliGRAM(s) Oral two times a day  melatonin 5 milliGRAM(s) Oral at bedtime  metoprolol tartrate 12.5 milliGRAM(s) Oral two times a day  mirtazapine 15 milliGRAM(s) Oral at bedtime  NIFEdipine XL 30 milliGRAM(s) Oral daily  QUEtiapine 100 milliGRAM(s) Oral at bedtime  QUEtiapine 50 milliGRAM(s) Oral daily  simvastatin 20 milliGRAM(s) Oral at bedtime      LABS:                        8.0    5.6   )-----------( 157      ( 26 Aug 2018 07:12 )             26.1     08-26    155<H>  |  121<H>  |  89<H>  ----------------------------<  114<H>  3.6   |  28  |  1.61<H>    Ca    8.3<L>      26 Aug 2018 12:48  Phos  4.4     08-25  Mg     2.2     08-25    TPro  7.2  /  Alb  2.8<L>  /  TBili  0.2  /  DBili  x   /  AST  18  /  ALT  20  /  AlkPhos  92  08-26      ASSESSMENT AND PLAN:     1. Hyperkalemia due advanced CKD  2. CKD stage 5. Dialysis refused  3. Anemia  4. Hypertension is better controlled.  2 g K diet. Kayexalate PRN  Keep patient euvolemic  Epogen 6000 q week. Check TSAT  Avoid Nephrtoxic Meds/ Agents such as NSAIDs, Gadolinium contrast, Phosphate containing enemas, etc..)  Adjust Medications according to eGFR  Obtain Intact PTH  Follow BMP, H/H

## 2018-08-27 ENCOUNTER — TRANSCRIPTION ENCOUNTER (OUTPATIENT)
Age: 83
End: 2018-08-27

## 2018-08-27 LAB
ANION GAP SERPL CALC-SCNC: 10 MMOL/L — SIGNIFICANT CHANGE UP (ref 5–17)
BUN SERPL-MCNC: 106 MG/DL — HIGH (ref 7–18)
CALCIUM SERPL-MCNC: 7.9 MG/DL — LOW (ref 8.4–10.5)
CHLORIDE SERPL-SCNC: 112 MMOL/L — HIGH (ref 96–108)
CO2 SERPL-SCNC: 22 MMOL/L — SIGNIFICANT CHANGE UP (ref 22–31)
CREAT SERPL-MCNC: 6.22 MG/DL — HIGH (ref 0.5–1.3)
GLUCOSE SERPL-MCNC: 135 MG/DL — HIGH (ref 70–99)
HCT VFR BLD CALC: 27.8 % — LOW (ref 39–50)
HGB BLD-MCNC: 8.5 G/DL — LOW (ref 13–17)
MAGNESIUM SERPL-MCNC: 1.9 MG/DL — SIGNIFICANT CHANGE UP (ref 1.6–2.6)
MCHC RBC-ENTMCNC: 30.5 GM/DL — LOW (ref 32–36)
MCHC RBC-ENTMCNC: 31.8 PG — SIGNIFICANT CHANGE UP (ref 27–34)
MCV RBC AUTO: 104.1 FL — HIGH (ref 80–100)
PHOSPHATE SERPL-MCNC: 4.1 MG/DL — SIGNIFICANT CHANGE UP (ref 2.5–4.5)
PLATELET # BLD AUTO: 154 K/UL — SIGNIFICANT CHANGE UP (ref 150–400)
POTASSIUM SERPL-MCNC: 4.4 MMOL/L — SIGNIFICANT CHANGE UP (ref 3.5–5.3)
POTASSIUM SERPL-SCNC: 4.4 MMOL/L — SIGNIFICANT CHANGE UP (ref 3.5–5.3)
RBC # BLD: 2.67 M/UL — LOW (ref 4.2–5.8)
RBC # FLD: 14.5 % — SIGNIFICANT CHANGE UP (ref 10.3–14.5)
SODIUM SERPL-SCNC: 144 MMOL/L — SIGNIFICANT CHANGE UP (ref 135–145)
WBC # BLD: 6.3 K/UL — SIGNIFICANT CHANGE UP (ref 3.8–10.5)
WBC # FLD AUTO: 6.3 K/UL — SIGNIFICANT CHANGE UP (ref 3.8–10.5)

## 2018-08-27 RX ORDER — ERYTHROPOIETIN 10000 [IU]/ML
4000 INJECTION, SOLUTION INTRAVENOUS; SUBCUTANEOUS
Qty: 0 | Refills: 0 | Status: DISCONTINUED | OUTPATIENT
Start: 2018-08-27 | End: 2018-08-28

## 2018-08-27 RX ORDER — SODIUM POLYSTYRENE SULFONATE 4.1 MEQ/G
15 POWDER, FOR SUSPENSION ORAL
Qty: 225 | Refills: 0 | OUTPATIENT
Start: 2018-08-27 | End: 2018-09-25

## 2018-08-27 RX ORDER — FUROSEMIDE 40 MG
3 TABLET ORAL
Qty: 45 | Refills: 0 | OUTPATIENT
Start: 2018-08-27 | End: 2018-09-25

## 2018-08-27 RX ADMIN — HEPARIN SODIUM 5000 UNIT(S): 5000 INJECTION INTRAVENOUS; SUBCUTANEOUS at 05:49

## 2018-08-27 RX ADMIN — SIMVASTATIN 20 MILLIGRAM(S): 20 TABLET, FILM COATED ORAL at 21:56

## 2018-08-27 RX ADMIN — HEPARIN SODIUM 5000 UNIT(S): 5000 INJECTION INTRAVENOUS; SUBCUTANEOUS at 21:56

## 2018-08-27 RX ADMIN — LAMOTRIGINE 100 MILLIGRAM(S): 25 TABLET, ORALLY DISINTEGRATING ORAL at 17:34

## 2018-08-27 RX ADMIN — Medication 12.5 MILLIGRAM(S): at 17:34

## 2018-08-27 RX ADMIN — QUETIAPINE FUMARATE 50 MILLIGRAM(S): 200 TABLET, FILM COATED ORAL at 12:39

## 2018-08-27 RX ADMIN — QUETIAPINE FUMARATE 100 MILLIGRAM(S): 200 TABLET, FILM COATED ORAL at 21:57

## 2018-08-27 RX ADMIN — HEPARIN SODIUM 5000 UNIT(S): 5000 INJECTION INTRAVENOUS; SUBCUTANEOUS at 15:36

## 2018-08-27 RX ADMIN — LAMOTRIGINE 100 MILLIGRAM(S): 25 TABLET, ORALLY DISINTEGRATING ORAL at 05:49

## 2018-08-27 RX ADMIN — ISOSORBIDE MONONITRATE 30 MILLIGRAM(S): 60 TABLET, EXTENDED RELEASE ORAL at 12:39

## 2018-08-27 RX ADMIN — MIRTAZAPINE 15 MILLIGRAM(S): 45 TABLET, ORALLY DISINTEGRATING ORAL at 21:57

## 2018-08-27 RX ADMIN — ERYTHROPOIETIN 4000 UNIT(S): 10000 INJECTION, SOLUTION INTRAVENOUS; SUBCUTANEOUS at 17:34

## 2018-08-27 RX ADMIN — Medication 12.5 MILLIGRAM(S): at 05:48

## 2018-08-27 RX ADMIN — Medication 5 MILLIGRAM(S): at 21:57

## 2018-08-27 RX ADMIN — Medication 325 MILLIGRAM(S): at 12:39

## 2018-08-27 RX ADMIN — Medication 30 MILLIGRAM(S): at 05:48

## 2018-08-27 NOTE — PROGRESS NOTE ADULT - PROBLEM SELECTOR PLAN 1
P/w hyperkalemia; known as per PCP, 2/2 ESRD, refuses HD  - EKG shows QRS widening; given Cocktail in ED  - DNR/DNI and no hemodialysis as per patient/HCP wishes.   Resolved  Monitor BMP  As per nephro: start on lasix 60mg every other day  Kayexalate 15gm every other day, hold for diarrhea, Potassium <4.0 P/w hyperkalemia;  2/2 ESRD, refuses HD  - EKG shows QRS widening; given Cocktail in ED  - DNR/DNI and no hemodialysis as per patient/HCP wishes.   Resolved  Monitor BMP  As per nephro: start on lasix 60mg every other day  Kayexalate 15gm every other day, hold for diarrhea, Potassium <4.0

## 2018-08-27 NOTE — DISCHARGE NOTE ADULT - CARE PLAN
Principal Discharge DX:	Hyperkalemia  Goal:	Normalization of serum potassium levels  Assessment and plan of treatment:	You were admitted with high potassium levels from end stage renal disease. Your potassium levels were brought under control. You were counseled regarding hemodialysis to prevent future recurrences, however you do not wish to proceed with it. Please followup with your PCP in one week following discharge.  Secondary Diagnosis:	HTN (hypertension)  Goal:	Maintain normal BP  Assessment and plan of treatment:	Please continue with home medicines Imdur, lopressor and nifedipine and followup with PCP  Secondary Diagnosis:	HLD (hyperlipidemia)  Assessment and plan of treatment:	Continue with statin  Secondary Diagnosis:	Bipolar 1 disorder  Assessment and plan of treatment:	Continue with lamotrigine, mirtazipine and seroquel for your bipolar disorder and depression. Please followup with your psychiatrist on outpatient basis Principal Discharge DX:	Hyperkalemia  Goal:	Normalization of serum potassium levels  Assessment and plan of treatment:	You were admitted with high potassium levels from end stage renal disease. Your potassium levels were brought under control. You were counseled regarding hemodialysis to prevent future recurrences, however you do not wish to proceed with it. Please followup with your PCP in one week following discharge.  Secondary Diagnosis:	HTN (hypertension)  Goal:	Maintain normal BP  Assessment and plan of treatment:	Please continue with home medicines Imdur, lopressor and nifedipine and followup with PCP  Secondary Diagnosis:	HLD (hyperlipidemia)  Assessment and plan of treatment:	Continue with statin  Secondary Diagnosis:	Bipolar 1 disorder  Assessment and plan of treatment:	Continue with lamotrigine, mirtazipine and seroquel for your bipolar disorder and depression. Please followup with your psychiatrist on outpatient basis  Secondary Diagnosis:	ESRD (end stage renal disease)  Assessment and plan of treatment:	You have been diagnosed with ESRD and refused hemodialysis. Please take lasix 60mg every other day, hold for potassium <4.0; Kayexalate 15gm every other day hold for diarrhea, or potassium <4.0. Please followup with nephrology for any questions.

## 2018-08-27 NOTE — PROGRESS NOTE ADULT - ATTENDING COMMENTS
Patient seen and examined. Patient's history, vitals, labs, imaging studies reviewed. Plan of care discussed with patient, and agrees, all questions answered.   Elsy Mulligan MD
Patient seen and examined. Patient's history, vitals, labs, imaging studies reviewed. Discussed with above resident, agree with note with edits. Plan of care discussed with patient, and agrees, all questions answered.   Elsy Mulligan MD
Patient seen and examined. Patient's history, vitals, labs, imaging studies reviewed. Discussed with above resident, agree with note. Plan of care discussed with patient, and agrees, all questions answered.   Elsy Mulligan MD

## 2018-08-27 NOTE — PROGRESS NOTE ADULT - ASSESSMENT
89 year old  male w/ PMH HTN, HLD, Bipolar Disorder, ESRD, and MDD p/w hyperkalemia - admitting for medical management   Pt assessed at the bedside. no New complaints. Patient wants to go home. Discharge planning.

## 2018-08-27 NOTE — PROGRESS NOTE ADULT - ASSESSMENT
1. Hyperkalemia due advanced CKD  2. CKD stage 5. Dialysis refused  3. Anemia  4. Hypertension is better controlled.  2 g K diet. Kayexalate PRN  Keep patient euvolemic  Epogen 6000 q week. Check TSAT  Avoid Nephrtoxic Meds/ Agents such as NSAIDs, Gadolinium contrast, Phosphate containing enemas, etc..)  Adjust Medications according to eGFR  Obtain Intact PTH  Follow BMP, H/H 1. Hyperkalemia due advanced CKD:now k is normal  -add low k diet  -Kayexalate 15 gm q other day   -Hold Kayexalate if k <4   -check k daily here and q week at NH  -Add Laisx 60 mg po q other day and hold for k <4  2. CKD stage 5. Dialysis refused by pt/family  -add renal diet  -keep pt evolemic and adjust all meds as per egfr  -avoid Nephrtoxic Meds/ Agents such as NSAIDs, Gadolinium contrast, Phosphate containing enemas, etc..)   3. Anemia:secondary to ckd  -add epogen tiw as ordered  4. Hypertension :bp is acceptble  -keep bp <140/80  -low salt diet

## 2018-08-27 NOTE — PROGRESS NOTE ADULT - PROBLEM SELECTOR PLAN 4
Pleasant at bedside; resumed outpatient psychiatric medications

## 2018-08-27 NOTE — PROGRESS NOTE ADULT - SUBJECTIVE AND OBJECTIVE BOX
PGY1 Note discussed with supervising resident and primary attending.    Patient is a 89y old  Male who presents with a chief complaint of hyperkalemia (27 Aug 2018 10:56)      INTERVAL HPI/OVERNIGHT EVENTS: no overnight events    MEDICATIONS  (STANDING):  epoetin sergio Injectable 4000 Unit(s) SubCutaneous <User Schedule>  ferrous    sulfate 325 milliGRAM(s) Oral daily  heparin  Injectable 5000 Unit(s) SubCutaneous every 8 hours  isosorbide   mononitrate ER Tablet (IMDUR) 30 milliGRAM(s) Oral daily  lamoTRIgine 100 milliGRAM(s) Oral two times a day  melatonin 5 milliGRAM(s) Oral at bedtime  metoprolol tartrate 12.5 milliGRAM(s) Oral two times a day  mirtazapine 15 milliGRAM(s) Oral at bedtime  NIFEdipine XL 30 milliGRAM(s) Oral daily  QUEtiapine 100 milliGRAM(s) Oral at bedtime  QUEtiapine 50 milliGRAM(s) Oral daily  simvastatin 20 milliGRAM(s) Oral at bedtime    MEDICATIONS  (PRN):      Allergies    penicillin (Hives)    Intolerances        REVIEW OF SYSTEMS:  CONSTITUTIONAL: No fever, weight loss, or fatigue  RESPIRATORY: No cough, wheezing, chills or hemoptysis; No shortness of breath  CARDIOVASCULAR: No chest pain, palpitations, dizziness, or leg swelling  GASTROINTESTINAL: No abdominal or epigastric pain. No nausea, vomiting, or hematemesis; No diarrhea or constipation. No melena or hematochezia.  NEUROLOGICAL: No headaches, memory loss, loss of strength, numbness, or tremors  SKIN: No itching, burning, rashes, or lesions     Vital Signs Last 24 Hrs  T(C): 37 (27 Aug 2018 19:03), Max: 37 (27 Aug 2018 19:03)  T(F): 98.6 (27 Aug 2018 19:03), Max: 98.6 (27 Aug 2018 19:03)  HR: 108 (27 Aug 2018 19:03) (69 - 108)  BP: 149/- (27 Aug 2018 19:03) (125/90 - 176/73)  BP(mean): --  RR: 18 (27 Aug 2018 19:03) (16 - 18)  SpO2: 98% (27 Aug 2018 19:03) (98% - 100%)    PHYSICAL EXAM:  GENERAL: NAD, well-groomed, well-developed  HEAD:  Atraumatic, Normocephalic  EYES: EOMI, PERRLA, conjunctiva and sclera clear  NECK: Supple, No JVD, Normal thyroid  CHEST/LUNG: Clear to percussion bilaterally; No rales, rhonchi, wheezing, or rubs  HEART: Regular rate and rhythm; No murmurs, rubs, or gallops  ABDOMEN: Soft, Nontender, Nondistended; Bowel sounds present  NERVOUS SYSTEM:  Alert & Oriented X3, Good concentration; Motor Strength 5/5 B/L   EXTREMITIES:  2+ Peripheral Pulses, No clubbing, cyanosis, or edema  SKIN;    LABS:                        8.5    6.3   )-----------( 154      ( 27 Aug 2018 10:44 )             27.8     08-27    144  |  112<H>  |  106<H>  ----------------------------<  135<H>  4.4   |  22  |  6.22<H>    Ca    7.9<L>      27 Aug 2018 10:44  Phos  4.1     08-27  Mg     1.9     08-27    TPro  7.2  /  Alb  2.8<L>  /  TBili  0.2  /  DBili  x   /  AST  18  /  ALT  20  /  AlkPhos  92  08-26        CAPILLARY BLOOD GLUCOSE          RADIOLOGY & ADDITIONAL TESTS:    Imaging Personally Reviewed:  [ ] YES  [ ] NO    Consultant(s) Notes Reviewed:  [ ] YES  [ ] NO PGY1 Note discussed with supervising resident and primary attending.    Patient is a 89y old  Male who presents with a chief complaint of hyperkalemia (27 Aug 2018 10:56)      INTERVAL HPI/OVERNIGHT EVENTS: no overnight events    MEDICATIONS  (STANDING):  epoetin sergio Injectable 4000 Unit(s) SubCutaneous <User Schedule>  ferrous    sulfate 325 milliGRAM(s) Oral daily  heparin  Injectable 5000 Unit(s) SubCutaneous every 8 hours  isosorbide   mononitrate ER Tablet (IMDUR) 30 milliGRAM(s) Oral daily  lamoTRIgine 100 milliGRAM(s) Oral two times a day  melatonin 5 milliGRAM(s) Oral at bedtime  metoprolol tartrate 12.5 milliGRAM(s) Oral two times a day  mirtazapine 15 milliGRAM(s) Oral at bedtime  NIFEdipine XL 30 milliGRAM(s) Oral daily  QUEtiapine 100 milliGRAM(s) Oral at bedtime  QUEtiapine 50 milliGRAM(s) Oral daily  simvastatin 20 milliGRAM(s) Oral at bedtime          Allergies    penicillin (Hives)            REVIEW OF SYSTEMS:  CONSTITUTIONAL: No fever, weight loss, or fatigue  RESPIRATORY: No cough, wheezing, chills or hemoptysis; No shortness of breath  CARDIOVASCULAR: No chest pain, palpitations, dizziness, or leg swelling  GASTROINTESTINAL: No abdominal or epigastric pain. No nausea, vomiting, or hematemesis; No diarrhea or constipation. No melena or hematochezia.  NEUROLOGICAL: No headaches, memory loss, loss of strength, numbness, or tremors  SKIN: No itching, burning, rashes, or lesions   All other ROS are negative    Vital Signs Last 24 Hrs  T(C): 37 (27 Aug 2018 19:03), Max: 37 (27 Aug 2018 19:03)  T(F): 98.6 (27 Aug 2018 19:03), Max: 98.6 (27 Aug 2018 19:03)  HR: 108 (27 Aug 2018 19:03) (69 - 108)  BP: 149/- (27 Aug 2018 19:03) (125/90 - 176/73)  RR: 18 (27 Aug 2018 19:03) (16 - 18)  SpO2: 98% (27 Aug 2018 19:03) (98% - 100%)    PHYSICAL EXAM:  GENERAL: NAD, well-groomed, well-developed  HEAD:  Atraumatic, Normocephalic  EYES: EOMI, PERRLA, conjunctiva and sclera clear  NECK: Supple, No JVD, Normal thyroid  CHEST/LUNG: Clear to percussion bilaterally; No rales, rhonchi, wheezing, or rubs  HEART: Regular rate and rhythm; No murmurs, rubs, or gallops  ABDOMEN: Soft, Nontender, Nondistended; Bowel sounds present  NERVOUS SYSTEM:  Alert & Oriented X3, Good concentration; Motor Strength 5/5 B/L   EXTREMITIES:  2+ Peripheral Pulses, No clubbing, cyanosis, or edema  SKIN;    LABS:                        8.5    6.3   )-----------( 154      ( 27 Aug 2018 10:44 )             27.8     08-27    144  |  112<H>  |  106<H>  ----------------------------<  135<H>  4.4   |  22  |  6.22<H>    Ca    7.9<L>      27 Aug 2018 10:44  Phos  4.1     08-27  Mg     1.9     08-27    TPro  7.2  /  Alb  2.8<L>  /  TBili  0.2  /  DBili  x   /  AST  18  /  ALT  20  /  AlkPhos  92  08-26        CAPILLARY BLOOD GLUCOSE          RADIOLOGY & ADDITIONAL TESTS:    Imaging Personally Reviewed:  [ ] YES  [ ] NO    Consultant(s) Notes Reviewed:  [ ] YES  [ ] NO

## 2018-08-27 NOTE — DISCHARGE NOTE ADULT - HOSPITAL COURSE
89 year old  male w/ PMH HTN, HLD, Bipolar Disorder, ESRD, and MDD p/w hyperkalemia - patient sent from NH for elevated potassium of 6, repeated in the ED showed 5.8. EKG was done showing widened QRS complexes and given calcium gluconate, regular insulin w/ dextrose, and albuterol nebulizers. Patient has a known Hx of hyperkalemia and ESRD - has had multiple discussions w/ outpatient nephrologist. I personally discussed w/ the patient the treatment plan including medical therapy and dialysis - patient and HCP (sister, Felisa) agree to medical therapy but refused HD - they are aware of the risks of doing so including death. Otherwise patient has no complaints - c/o hunger at bedside and reports that he still urinates albeit incontinent and in a adult diaper. Patient was given cocktail for hyperkalemia. He was also counseled for HD, but both patient and HCP refused with full understanding. Potassium levels currently stable. Patient stable for discharge to rehab as per attending.

## 2018-08-27 NOTE — DISCHARGE NOTE ADULT - PATIENT PORTAL LINK FT
You can access the [facility] Portal, offered by [facility], by registering with the following website: [email or web address]

## 2018-08-27 NOTE — PHYSICAL THERAPY INITIAL EVALUATION ADULT - DIAGNOSIS, PT EVAL
Pt. presents w/ limited mobility due to weakness, dec. motivation to perform activities, Pt. w/ dec. balance and ms. strength.

## 2018-08-27 NOTE — PROGRESS NOTE ADULT - SUBJECTIVE AND OBJECTIVE BOX
pt seen and examined.pts current chart reviewed and case discussed with resident covering.    SUBJECTIVE:  pt feels fine and denies cp,sob,gi or gu/uremic  symptons    REVIEW OF SYSTEMS:  CONSTITUTIONAL: No weakness, fevers or chills  EYES/ENT: No visual changes;  No vertigo or throat pain   NECK: No pain or stiffness  RESPIRATORY: No cough, wheezing, hemoptysis; No shortness of breath  CARDIOVASCULAR: No chest pain or palpitations  GASTROINTESTINAL: No abdominal or epigastric pain. No nausea, vomiting, or hematemesis; No diarrhea or constipation. No melena or hematochezia.  GENITOURINARY: No dysuria, frequency , hematuria, flank pain or nocturia  NEUROLOGICAL: No numbness or weakness  SKIN: No itching, burning, rashes, or lesions   All other review of systems is negative unless indicated above    Current meds:    ferrous    sulfate 325 milliGRAM(s) Oral daily  heparin  Injectable 5000 Unit(s) SubCutaneous every 8 hours  isosorbide   mononitrate ER Tablet (IMDUR) 30 milliGRAM(s) Oral daily  lamoTRIgine 100 milliGRAM(s) Oral two times a day  melatonin 5 milliGRAM(s) Oral at bedtime  metoprolol tartrate 12.5 milliGRAM(s) Oral two times a day  mirtazapine 15 milliGRAM(s) Oral at bedtime  NIFEdipine XL 30 milliGRAM(s) Oral daily  QUEtiapine 100 milliGRAM(s) Oral at bedtime  QUEtiapine 50 milliGRAM(s) Oral daily  simvastatin 20 milliGRAM(s) Oral at bedtime      Vital Signs    T(F): 98.1 (18 @ 11:02), Max: 98.4 (18 @ 07:24)  HR: 72 (18 @ 12:28) (64 - 86)  BP: 172/72 (18 @ 12:28) (122/50 - 176/73)  ABP: --  RR: 16 (18 @ 11:02) (16 - 17)  SpO2: 98% (18 @ 12:28) (98% - 100%)  Wt(kg): --  CVP(cm H2O): --  CO: --  PCWP: --    I and O's:     @ 07: @ 07:00  --------------------------------------------------------  IN:    Oral Fluid: 500 mL  Total IN: 500 mL    OUT:  Total OUT: 0 mL    Total NET: 500 mL       @ 07: @ 07:00  --------------------------------------------------------  IN:    IV PiggyBack: 100 mL    sodium chloride 0.45%: 210 mL  Total IN: 310 mL    OUT:  Total OUT: 0 mL    Total NET: 310 mL       @ 07: @ 14:37  --------------------------------------------------------  IN:    Oral Fluid: 400 mL  Total IN: 400 mL    OUT:  Total OUT: 0 mL    Total NET: 400 mL        Daily     Daily Weight in k.3 (27 Aug 2018 04:34)    PHYSICAL EXAM:  Constitutional: well developed, well nourished  and in nad  HEENT: PERRLA,  no icteric sclera and mild pallor of conjunctiva noted  Neck: No JVD, thyromegaly or adenopathy  Respiratory: reduced air entry lower lungs with no rales, wheezing or rhonchi  Cardiovascular: S1 and S2 normally heard  Gastrointestinal: soft, nondistended, nontender and normal bowel sounds heard  Extremities: No peripheral edema or cyanosis  Neurological: A/O x 3, no focal deficits  : No flank or cva tenderness palpated.  Skin: No rashes      LABS:    CBC:                          8.5    6.3   )-----------( 154      ( 27 Aug 2018 10:44 )             27.8           BMP:        144  |  112<H>  |  106<H>  ----------------------------<  135<H>  4.4   |  22  |  6.22<H>  08    142  |  110<H>  |  106<H>  ----------------------------<  106<H>  4.5   |  19<L>  |  6.27<H>  08    155<H>  |  121<H>  |  89<H>  ----------------------------<  114<H>  3.6   |  28  |  1.61<H>  08    141  |  110<H>  |  114<H>  ----------------------------<  97  5.4<H>   |  21<L>  |  6.60<H>  08    140  |  108  |  109<H>  ----------------------------<  77  5.0   |  21<L>  |  6.26<H>  08    138  |  107  |  112<H>  ----------------------------<  113<H>  5.8<H>   |  19<L>  |  6.39<H>    Ca    7.9<L>      27 Aug 2018 10:44  Ca    7.8<L>      26 Aug 2018 22:26  Ca    8.3<L>      26 Aug 2018 12:48  Ca    8.1<L>      26 Aug 2018 07:12  Ca    8.9      25 Aug 2018 07:06  Ca    8.2<L>      24 Aug 2018 21:36  Phos  4.1       Phos  4.4       Mg     1.9       Mg     2.2       Mg     2.1         TPro  7.2  /  Alb  2.8<L>  /  TBili  0.2  /  DBili  x   /  AST  18  /  ALT  20  /  AlkPhos  92    TPro  8.0  /  Alb  3.1<L>  /  TBili  0.2  /  DBili  x   /  AST  19  /  ALT  19  /  AlkPhos  102  08-      Thyroid Stimulating Hormone, Serum: 1.56 uU/mL ( @ 07:06)      URINE STUDIES:                        RADIOLOGY & ADDITIONAL STUDIES: pt seen and examined.pts current chart reviewed and case discussed with resident covering.    SUBJECTIVE:  pt denies cp,sob,gi or uremic  symptons  pts wife at bedside  REVIEW OF SYSTEMS:  CONSTITUTIONAL: No weakness, fevers or chills  RESPIRATORY: No cough, wheezing, hemoptysis; No shortness of breath  CARDIOVASCULAR: No chest pain or palpitations  GASTROINTESTINAL: No abdominal or epigastric pain. No nausea, vomiting, or hematemesis; No diarrhea or constipation. No melena or hematochezia.  GENITOURINARY: No dysuria, frequency , hematuria, flank pain or nocturia  NEUROLOGICAL: No numbness or weakness  SKIN: No itching, burning, rashes, or lesions   All other review of systems is negative unless indicated above    Current meds:    ferrous    sulfate 325 milliGRAM(s) Oral daily  heparin  Injectable 5000 Unit(s) SubCutaneous every 8 hours  isosorbide   mononitrate ER Tablet (IMDUR) 30 milliGRAM(s) Oral daily  lamoTRIgine 100 milliGRAM(s) Oral two times a day  melatonin 5 milliGRAM(s) Oral at bedtime  metoprolol tartrate 12.5 milliGRAM(s) Oral two times a day  mirtazapine 15 milliGRAM(s) Oral at bedtime  NIFEdipine XL 30 milliGRAM(s) Oral daily  QUEtiapine 100 milliGRAM(s) Oral at bedtime  QUEtiapine 50 milliGRAM(s) Oral daily  simvastatin 20 milliGRAM(s) Oral at bedtime      Vital Signs    T(F): 98.1 (18 @ 11:02), Max: 98.4 (18 @ 07:24)  HR: 72 (18 @ 12:28) (64 - 86)  BP: 172/72 (18 @ 12:28) (122/50 - 176/73)  ABP: --  RR: 16 (18 @ 11:02) (16 - 17)  SpO2: 98% (18 @ 12:28) (98% - 100%)  Wt(kg): --  CVP(cm H2O): --  CO: --  PCWP: --    I and O's:     @ 07:01  -   @ 07:00  --------------------------------------------------------  IN:    Oral Fluid: 500 mL  Total IN: 500 mL    OUT:  Total OUT: 0 mL    Total NET: 500 mL       @ 07: @ 07:00  --------------------------------------------------------  IN:    IV PiggyBack: 100 mL    sodium chloride 0.45%: 210 mL  Total IN: 310 mL    OUT:  Total OUT: 0 mL    Total NET: 310 mL       @ : @ 14:37  --------------------------------------------------------  IN:    Oral Fluid: 400 mL  Total IN: 400 mL    OUT:  Total OUT: 0 mL    Total NET: 400 mL        Daily     Daily Weight in k.3 (27 Aug 2018 04:34)    PHYSICAL EXAM:  GENERAL: wee built, well-developed and in nad  HEAD:  Atraumatic, Normocephalic  EYES: Sclera anicteric and mild palor of the conj.noted  ENMT: Moist mucous membranes  NECK: Supple, No JVD  CHEST/LUNG: Clear   HEART: Regular rate and rhythm; No murmurs  ABDOMEN: Soft, Nontender, Nondistended; Bowel sounds present  EXTREMITIES:  No edema  SKIN: Normal turgor      LABS:    CBC:                          8.5    6.3   )-----------( 154      ( 27 Aug 2018 10:44 )             27.8           BMP:        144  |  112<H>  |  106<H>  ----------------------------<  135<H>  4.4   |  22  |  6.22<H>      142  |  110<H>  |  106<H>  ----------------------------<  106<H>  4.5   |  19<L>  |  6.27<H>      155<H>  |  121<H>  |  89<H>  ----------------------------<  114<H>  3.6   |  28  |  1.61<H>      141  |  110<H>  |  114<H>  ----------------------------<  97  5.4<H>   |  21<L>  |  6.60<H>  08    140  |  108  |  109<H>  ----------------------------<  77  5.0   |  21<L>  |  6.26<H>      138  |  107  |  112<H>  ----------------------------<  113<H>  5.8<H>   |  19<L>  |  6.39<H>    Ca    7.9<L>      27 Aug 2018 10:44  Ca    7.8<L>      26 Aug 2018 22:26  Ca    8.3<L>      26 Aug 2018 12:48  Ca    8.1<L>      26 Aug 2018 07:12  Ca    8.9      25 Aug 2018 07:06  Ca    8.2<L>      24 Aug 2018 21:36  Phos  4.1       Phos  4.4       Mg     1.9       Mg     2.2       Mg     2.1         TPro  7.2  /  Alb  2.8<L>  /  TBili  0.2  /  DBili  x   /  AST  18  /  ALT  20  /  AlkPhos  92    TPro  8.0  /  Alb  3.1<L>  /  TBili  0.2  /  DBili  x   /  AST  19  /  ALT  19  /  AlkPhos  102        Thyroid Stimulating Hormone, Serum: 1.56 uU/mL ( @ 07:06)

## 2018-08-27 NOTE — DISCHARGE NOTE ADULT - OTHER SIGNIFICANT FINDINGS
Patient seen and examined. Patient's history, vitals, labs, imaging studies reviewed. Discussed with above resident, agree with note with edits. Plan of care discussed with patient, and agrees, all questions answered. Patient is medically stable for discharge.  Elsy Mulligan MD  Time spent > 45 minutes  8/27/2018 Patient seen and examined. Patient's history, vitals, labs, imaging studies reviewed. Discussed with above resident, agree with note with edits. Plan of care discussed with patient, and agrees, all questions answered. Patient is medically stable for discharge.  VSS  GENERAL: NAD,  HEAD:  Atraumatic, Normocephalic  EYES: EOMI, PERRL, conjunctiva and sclera clear  NECK: Supple, No JVD, Normal thyroid  CHEST/LUNG: Clear to ascultation bilaterally; No rales, rhonchi, wheezing, or rubs  HEART: Regular rate and rhythm;  murmurs, no rubs, or gallops  ABDOMEN: Soft, Nontender, Nondistended; Bowel sounds present  NERVOUS SYSTEM:  Awake, Alert & Oriented   EXTREMITIES:  2+ Peripheral Pulses, No clubbing, cyanosis, or edema  SKIN; no rash    Elsy Mulligan MD  Time spent > 45 minutes  8/27/2018 Patient seen and examined. Patient's history, vitals, labs, imaging studies reviewed. Discussed with above resident, agree with note with edits. Plan of care discussed with patient, and agrees, all questions answered. Patient is medically stable for discharge.  VSS  GENERAL: NAD,  HEAD:  Atraumatic, Normocephalic  EYES: EOMI, PERRL, conjunctiva and sclera clear  NECK: Supple, No JVD, Normal thyroid  CHEST/LUNG: Clear to ascultation bilaterally; No rales, rhonchi, wheezing, or rubs  HEART: Regular rate and rhythm;  murmurs, no rubs, or gallops  ABDOMEN: Soft, Nontender, Nondistended; Bowel sounds present  NERVOUS SYSTEM:  Awake, Alert & Oriented   EXTREMITIES:  2+ Peripheral Pulses, No clubbing, cyanosis, or edema  SKIN; no rash    Elsy Mulligan MD  Time spent > 45 minutes  8/28/2018

## 2018-08-27 NOTE — DISCHARGE NOTE ADULT - MEDICATION SUMMARY - MEDICATIONS TO TAKE
I will START or STAY ON the medications listed below when I get home from the hospital:    Imdur 30 mg oral tablet, extended release  -- 1 tab(s) by mouth once a day (in the morning)  -- Indication: For HTN (hypertension)    LaMICtal 100 mg oral tablet  -- 1 tab(s) by mouth 2 times a day  -- Indication: For Bipolar 1 disorder    mirtazapine 15 mg oral tablet  -- 1 tab(s) by mouth once a day (at bedtime)  -- Indication: For Bipolar 1 disorder    simvastatin 20 mg oral tablet  -- 1 tab(s) by mouth once a day (at bedtime)  -- Indication: For HLD (hyperlipidemia)    SEROquel 50 mg oral tablet  -- 1 tab(s) by mouth once a day  -- Indication: For Bipolar 1 disorder    SEROquel 100 mg oral tablet  -- 1 tab(s) by mouth once a day (at bedtime)  -- Indication: For Bipolar 1 disorder    metoprolol tartrate 25 mg oral tablet  -- 0.5 tab(s) by mouth 2 times a day  -- Indication: For HTN (hypertension)    NIFEdipine 30 mg oral tablet, extended release  -- 1 tab(s) by mouth once a day  -- Indication: For HTN (hypertension)    ferrous sulfate 325 mg (65 mg elemental iron) oral tablet  -- 1 tab(s) by mouth 2 times a day  -- Indication: For Anemia    Melatonin 5 mg oral tablet  -- 1 tab(s) by mouth once a day (at bedtime)  -- Indication: For Insomnia I will START or STAY ON the medications listed below when I get home from the hospital:    Imdur 30 mg oral tablet, extended release  -- 1 tab(s) by mouth once a day (in the morning)  -- Indication: For HTN (hypertension)    LaMICtal 100 mg oral tablet  -- 1 tab(s) by mouth 2 times a day  -- Indication: For Bipolar 1 disorder    mirtazapine 15 mg oral tablet  -- 1 tab(s) by mouth once a day (at bedtime)  -- Indication: For Bipolar 1 disorder    simvastatin 20 mg oral tablet  -- 1 tab(s) by mouth once a day (at bedtime)  -- Indication: For HLD (hyperlipidemia)    SEROquel 50 mg oral tablet  -- 1 tab(s) by mouth once a day  -- Indication: For Bipolar 1 disorder    SEROquel 100 mg oral tablet  -- 1 tab(s) by mouth once a day (at bedtime)  -- Indication: For Bipolar 1 disorder    metoprolol tartrate 25 mg oral tablet  -- 0.5 tab(s) by mouth 2 times a day  -- Indication: For HTN (hypertension)    NIFEdipine 30 mg oral tablet, extended release  -- 1 tab(s) by mouth once a day  -- Indication: For HTN (hypertension)    Kayexalate oral and rectal powder  -- 15 gram(s) by mouth every other day. Hold for diarrhea, or if potassium <4.0  -- Not to be used with sorbitol  Shake well before use.    -- Indication: For ESRD (end stage renal disease)    Lasix 20 mg oral tablet  -- 3 tab(s) by mouth every other day. Hold if potassium <4.0  -- Avoid prolonged or excessive exposure to direct and/or artificial sunlight while taking this medication.  It is very important that you take or use this exactly as directed.  Do not skip doses or discontinue unless directed by your doctor.  It may be advisable to drink a full glass orange juice or eat a banana daily while taking this medication.    -- Indication: For ESRD (end stage renal disease)    ferrous sulfate 325 mg (65 mg elemental iron) oral tablet  -- 1 tab(s) by mouth 2 times a day  -- Indication: For Anemia    Melatonin 5 mg oral tablet  -- 1 tab(s) by mouth once a day (at bedtime)  -- Indication: For Insomnia

## 2018-08-27 NOTE — DISCHARGE NOTE ADULT - PLAN OF CARE
Normalization of serum potassium levels You were admitted with high potassium levels from end stage renal disease. Your potassium levels were brought under control. You were counseled regarding hemodialysis to prevent future recurrences, however you do not wish to proceed with it. Please followup with your PCP in one week following discharge. Maintain normal BP Please continue with home medicines Imdur, lopressor and nifedipine and followup with PCP Continue with statin Continue with lamotrigine, mirtazipine and seroquel for your bipolar disorder and depression. Please followup with your psychiatrist on outpatient basis You have been diagnosed with ESRD and refused hemodialysis. Please take lasix 60mg every other day, hold for potassium <4.0; Kayexalate 15gm every other day hold for diarrhea, or potassium <4.0. Please followup with nephrology for any questions.

## 2018-08-27 NOTE — PROGRESS NOTE ADULT - PROBLEM SELECTOR PLAN 2
Elevated in ED;  c/w  Imdur, Lopressor, and Nifedipine  - Monitor and adjust PRN; no signs of fluid overload on examination

## 2018-08-27 NOTE — PHYSICAL THERAPY INITIAL EVALUATION ADULT - PERTINENT HX OF CURRENT PROBLEM, REHAB EVAL
Pt. admitted from NH due to elevated Potassium levels. Pt. w/ h/o Bipolar disorder, ESRD, MDD; WC bound/chairfast.

## 2018-08-27 NOTE — DISCHARGE NOTE ADULT - CARE PROVIDER_API CALL
Edilberto Randolph), Internal Medicine; Nephrology  [address] [place] [address]  Phone: [phone]  Fax: [phone]

## 2018-08-28 VITALS — RESPIRATION RATE: 18 BRPM | TEMPERATURE: 98 F | HEART RATE: 79 BPM | OXYGEN SATURATION: 98 %

## 2018-08-28 LAB
ANION GAP SERPL CALC-SCNC: 11 MMOL/L — SIGNIFICANT CHANGE UP (ref 5–17)
BUN SERPL-MCNC: 110 MG/DL — HIGH (ref 7–18)
CALCIUM SERPL-MCNC: 8.1 MG/DL — LOW (ref 8.4–10.5)
CALCIUM SERPL-MCNC: 8.5 MG/DL — SIGNIFICANT CHANGE UP (ref 8.4–10.5)
CHLORIDE SERPL-SCNC: 113 MMOL/L — HIGH (ref 96–108)
CO2 SERPL-SCNC: 22 MMOL/L — SIGNIFICANT CHANGE UP (ref 22–31)
CREAT SERPL-MCNC: 6.52 MG/DL — HIGH (ref 0.5–1.3)
GLUCOSE SERPL-MCNC: 104 MG/DL — HIGH (ref 70–99)
POTASSIUM SERPL-MCNC: 4.2 MMOL/L — SIGNIFICANT CHANGE UP (ref 3.5–5.3)
POTASSIUM SERPL-SCNC: 4.2 MMOL/L — SIGNIFICANT CHANGE UP (ref 3.5–5.3)
PTH-INTACT FLD-MCNC: 277 PG/ML — HIGH (ref 15–65)
SODIUM SERPL-SCNC: 146 MMOL/L — HIGH (ref 135–145)

## 2018-08-28 RX ADMIN — HEPARIN SODIUM 5000 UNIT(S): 5000 INJECTION INTRAVENOUS; SUBCUTANEOUS at 05:16

## 2018-08-28 RX ADMIN — LAMOTRIGINE 100 MILLIGRAM(S): 25 TABLET, ORALLY DISINTEGRATING ORAL at 05:17

## 2018-08-28 RX ADMIN — Medication 30 MILLIGRAM(S): at 05:17

## 2018-08-28 RX ADMIN — QUETIAPINE FUMARATE 50 MILLIGRAM(S): 200 TABLET, FILM COATED ORAL at 11:52

## 2018-08-28 RX ADMIN — Medication 325 MILLIGRAM(S): at 11:52

## 2018-08-28 RX ADMIN — Medication 12.5 MILLIGRAM(S): at 05:17

## 2018-08-28 RX ADMIN — HEPARIN SODIUM 5000 UNIT(S): 5000 INJECTION INTRAVENOUS; SUBCUTANEOUS at 13:14

## 2018-08-28 RX ADMIN — ISOSORBIDE MONONITRATE 30 MILLIGRAM(S): 60 TABLET, EXTENDED RELEASE ORAL at 11:52

## 2018-08-28 NOTE — CHART NOTE - NSCHARTNOTEFT_GEN_A_CORE
Patient seen and examined this morning. Patient denies any complaints. ALl ROS negative  PHYSICAL EXAM:  VSS  GENERAL: Elderly male, NAD  HEAD:  Atraumatic, Normocephalic  EYES: EOMI, PERRL, conjunctiva and sclera clear  NECK: Supple, No JVD, Normal thyroid  CHEST/LUNG: Clear to percussion bilaterally; No rales, rhonchi, wheezing, or rubs  HEART: Regular rate and rhythm; No murmurs, rubs, or gallops  ABDOMEN: Soft, Nontender, Nondistended; Bowel sounds present  NERVOUS SYSTEM:  Alert & Oriented X3, Good concentration; no focal deficits  EXTREMITIES:  2+ Peripheral Pulses, No clubbing, cyanosis, or edema  SKIN: no rashes  See discharge note for details. Patient is medically stable for discharge today to [facility].  Elsy Mulligan  8/28/2018

## 2018-09-06 RX ORDER — FERROUS SULFATE 325(65) MG
1 TABLET ORAL
Qty: 0 | Refills: 0 | COMMUNITY

## 2018-09-06 RX ORDER — SIMVASTATIN 20 MG/1
1 TABLET, FILM COATED ORAL
Qty: 0 | Refills: 0 | COMMUNITY

## 2018-09-06 RX ORDER — LAMOTRIGINE 25 MG/1
1 TABLET, ORALLY DISINTEGRATING ORAL
Qty: 0 | Refills: 0 | COMMUNITY

## 2018-09-06 RX ORDER — LANOLIN ALCOHOL/MO/W.PET/CERES
1 CREAM (GRAM) TOPICAL
Qty: 0 | Refills: 0 | COMMUNITY

## 2018-09-06 RX ORDER — QUETIAPINE FUMARATE 200 MG/1
1 TABLET, FILM COATED ORAL
Qty: 0 | Refills: 0 | COMMUNITY

## 2018-09-06 RX ORDER — METOPROLOL TARTRATE 50 MG
0.5 TABLET ORAL
Qty: 0 | Refills: 0 | COMMUNITY

## 2018-09-06 RX ORDER — MIRTAZAPINE 45 MG/1
1 TABLET, ORALLY DISINTEGRATING ORAL
Qty: 0 | Refills: 0 | COMMUNITY

## 2018-09-06 RX ORDER — ISOSORBIDE MONONITRATE 60 MG/1
1 TABLET, EXTENDED RELEASE ORAL
Qty: 0 | Refills: 0 | COMMUNITY

## 2018-09-06 RX ORDER — NIFEDIPINE 30 MG
1 TABLET, EXTENDED RELEASE 24 HR ORAL
Qty: 0 | Refills: 0 | COMMUNITY

## 2021-06-17 NOTE — PATIENT PROFILE ADULT. - TEACHING/LEARNING FACTORS INFLUENCE READINESS TO LEARN
acuteness of illness Complex Repair And Flap Additional Text (Will Appearing After The Standard Complex Repair Text): The complex repair was not sufficient to completely close the primary defect. The remaining additional defect was repaired with the flap mentioned below.

## 2022-11-30 NOTE — ED ADULT TRIAGE NOTE - WEIGHT METHOD
stated Ears: no ear pain and no hearing problems. Nose: no nasal congestion and no nasal drainage. Mouth/Throat: no dysphagia, no hoarseness and no throat pain. Neck: no lumps, no pain, no stiffness and no swollen glands.

## 2023-06-30 NOTE — PROGRESS NOTE ADULT - PROBLEM SELECTOR PLAN 2
Elevated in ED;  c/w  Imdur, Lopressor, and Nifedipine  - Monitor and adjust PRN; no signs of fluid overload on examination Initial (On Arrival)